# Patient Record
Sex: MALE | Race: WHITE | NOT HISPANIC OR LATINO
[De-identification: names, ages, dates, MRNs, and addresses within clinical notes are randomized per-mention and may not be internally consistent; named-entity substitution may affect disease eponyms.]

---

## 2017-08-14 ENCOUNTER — TRANSCRIPTION ENCOUNTER (OUTPATIENT)
Age: 67
End: 2017-08-14

## 2018-07-23 ENCOUNTER — TRANSCRIPTION ENCOUNTER (OUTPATIENT)
Age: 68
End: 2018-07-23

## 2018-08-28 ENCOUNTER — OUTPATIENT (OUTPATIENT)
Dept: OUTPATIENT SERVICES | Facility: HOSPITAL | Age: 68
LOS: 1 days | End: 2018-08-28

## 2020-02-20 ENCOUNTER — TRANSCRIPTION ENCOUNTER (OUTPATIENT)
Age: 70
End: 2020-02-20

## 2020-03-23 ENCOUNTER — TRANSCRIPTION ENCOUNTER (OUTPATIENT)
Age: 70
End: 2020-03-23

## 2020-03-24 ENCOUNTER — OUTPATIENT (OUTPATIENT)
Dept: OUTPATIENT SERVICES | Facility: HOSPITAL | Age: 70
LOS: 1 days | End: 2020-03-24

## 2020-12-02 ENCOUNTER — OUTPATIENT (OUTPATIENT)
Dept: OUTPATIENT SERVICES | Facility: HOSPITAL | Age: 70
LOS: 1 days | End: 2020-12-02

## 2021-04-28 ENCOUNTER — APPOINTMENT (OUTPATIENT)
Dept: MRI IMAGING | Facility: CLINIC | Age: 71
End: 2021-04-28

## 2021-05-04 ENCOUNTER — OUTPATIENT (OUTPATIENT)
Dept: OUTPATIENT SERVICES | Facility: HOSPITAL | Age: 71
LOS: 1 days | End: 2021-05-04

## 2021-05-16 ENCOUNTER — TRANSCRIPTION ENCOUNTER (OUTPATIENT)
Age: 71
End: 2021-05-16

## 2021-06-14 ENCOUNTER — APPOINTMENT (OUTPATIENT)
Dept: DISASTER EMERGENCY | Facility: CLINIC | Age: 71
End: 2021-06-14

## 2021-06-14 DIAGNOSIS — Z01.818 ENCOUNTER FOR OTHER PREPROCEDURAL EXAMINATION: ICD-10-CM

## 2021-06-15 LAB — SARS-COV-2 N GENE NPH QL NAA+PROBE: NOT DETECTED

## 2021-06-17 ENCOUNTER — OUTPATIENT (OUTPATIENT)
Dept: OUTPATIENT SERVICES | Facility: HOSPITAL | Age: 71
LOS: 1 days | End: 2021-06-17

## 2021-06-28 ENCOUNTER — APPOINTMENT (OUTPATIENT)
Dept: NEUROSURGERY | Facility: CLINIC | Age: 71
End: 2021-06-28
Payer: MEDICARE

## 2021-06-28 VITALS — HEIGHT: 74 IN | WEIGHT: 200 LBS | BODY MASS INDEX: 25.67 KG/M2

## 2021-06-28 PROCEDURE — 99204 OFFICE O/P NEW MOD 45 MIN: CPT

## 2021-06-28 NOTE — PLAN
[FreeTextEntry1] : In summary this 62-year-old male has had 10 years of residual left leg pain and left leg weakness since surgery in 2010 with Dr. Leal presents for neurosurgical consultation.  At this point I have reviewed his symptoms and his MRIs and there is no neurosurgical intervention that we may offer him at this time.  I recommend continued pain management to include nonnarcotic options.  Patient understands and agrees.  I have answered all the patient's questions to his satisfaction

## 2021-06-28 NOTE — HISTORY OF PRESENT ILLNESS
[FreeTextEntry1] : Low back and left leg pain [de-identified] : This is a 70-year-old male has a history of a L4-L5 decompression fusion with Dr. Leal approximately 10 years ago he states after the surgery he has had residual left leg weakness as well as low back and left leg pain.  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  He is trying to avoid narcotic pain medication now and is determining whether there is any surgical options available to help with his pain.  He reports pain in his low back left buttock and radiates that is to his left left thigh and leg.  He reports numbness tingling in the same area reports weakness in the same area denies any bladder bowel dysfunction.  He states that none of his symptoms have changed over the past many years.  In the past he is undergone physical therapy medication treatment and pain management which has included epidural steroid injections.  Has not seen his pain management doctor in quite some time

## 2021-06-28 NOTE — RESULTS/DATA
[FreeTextEntry1] : MRI of the lumbar spine which shows a previous L4-L5 decompression.  There are small disc bulges seen throughout the adjacent levels but no explanation regards to his symptomology no severe stenosis seen no significant nerve root encroachment seen

## 2021-06-28 NOTE — REASON FOR VISIT
[New Patient Visit] : a new patient visit [FreeTextEntry1] : lOWER BACK PAIN- PREVIOUS SURGERY- IMAGING DONE AT [facility].

## 2021-07-09 ENCOUNTER — APPOINTMENT (OUTPATIENT)
Dept: NEUROSURGERY | Facility: CLINIC | Age: 71
End: 2021-07-09
Payer: MEDICARE

## 2021-07-09 DIAGNOSIS — M96.1 POSTLAMINECTOMY SYNDROME, NOT ELSEWHERE CLASSIFIED: ICD-10-CM

## 2021-07-09 PROCEDURE — 99441: CPT

## 2021-07-09 NOTE — REASON FOR VISIT
[Follow-Up: _____] : a [unfilled] follow-up visit [Home] : at home, [unfilled] , at the time of the visit. [Medical Office: ([facility])___] : at the medical office located in  [FreeTextEntry1] : MRI Review- [facility]

## 2021-07-09 NOTE — HISTORY OF PRESENT ILLNESS
[FreeTextEntry1] : I spoke to Wang on the telephone today to summarize some of the findings of his MRI study done at [facility].  He had a L4-S1 interbody fusion with pedicle screws in the distant past by Dr. Leal.  Imaging looks quite good.  The decompression in the region of the surgery appears completely satisfactory.  He has a robust fusion of the spine.  The interbody spaces are in position.  The pedicle screws appear to be in good position.  There is minimal to no adjacent segment degeneration.  This point I explained all of this to him in detail and is really no role for surgical intervention.  I recommended a pain management evaluation and other nonsurgical methods to treat his back problem.  He seems satisfied with this conversation.  He is discharged from my point of view.

## 2021-10-21 ENCOUNTER — TRANSCRIPTION ENCOUNTER (OUTPATIENT)
Age: 71
End: 2021-10-21

## 2022-01-05 ENCOUNTER — TRANSCRIPTION ENCOUNTER (OUTPATIENT)
Age: 72
End: 2022-01-05

## 2022-06-27 ENCOUNTER — APPOINTMENT (OUTPATIENT)
Dept: CARDIOLOGY | Facility: CLINIC | Age: 72
End: 2022-06-27
Payer: MEDICARE

## 2022-06-27 ENCOUNTER — NON-APPOINTMENT (OUTPATIENT)
Age: 72
End: 2022-06-27

## 2022-06-27 VITALS — DIASTOLIC BLOOD PRESSURE: 76 MMHG | SYSTOLIC BLOOD PRESSURE: 142 MMHG

## 2022-06-27 VITALS
DIASTOLIC BLOOD PRESSURE: 80 MMHG | RESPIRATION RATE: 16 BRPM | HEIGHT: 74 IN | OXYGEN SATURATION: 95 % | BODY MASS INDEX: 26.82 KG/M2 | HEART RATE: 89 BPM | TEMPERATURE: 97.8 F | WEIGHT: 209 LBS | SYSTOLIC BLOOD PRESSURE: 140 MMHG

## 2022-06-27 DIAGNOSIS — E78.2 MIXED HYPERLIPIDEMIA: ICD-10-CM

## 2022-06-27 DIAGNOSIS — F41.0 PANIC DISORDER [EPISODIC PAROXYSMAL ANXIETY]: ICD-10-CM

## 2022-06-27 DIAGNOSIS — Z86.19 PERSONAL HISTORY OF OTHER INFECTIOUS AND PARASITIC DISEASES: ICD-10-CM

## 2022-06-27 DIAGNOSIS — I45.10 UNSPECIFIED RIGHT BUNDLE-BRANCH BLOCK: ICD-10-CM

## 2022-06-27 DIAGNOSIS — I10 ESSENTIAL (PRIMARY) HYPERTENSION: ICD-10-CM

## 2022-06-27 DIAGNOSIS — Z78.9 OTHER SPECIFIED HEALTH STATUS: ICD-10-CM

## 2022-06-27 DIAGNOSIS — R01.1 CARDIAC MURMUR, UNSPECIFIED: ICD-10-CM

## 2022-06-27 DIAGNOSIS — E78.1 PURE HYPERGLYCERIDEMIA: ICD-10-CM

## 2022-06-27 DIAGNOSIS — F12.90 CANNABIS USE, UNSPECIFIED, UNCOMPLICATED: ICD-10-CM

## 2022-06-27 DIAGNOSIS — Z00.00 ENCOUNTER FOR GENERAL ADULT MEDICAL EXAMINATION W/OUT ABNORMAL FINDINGS: ICD-10-CM

## 2022-06-27 PROCEDURE — 93000 ELECTROCARDIOGRAM COMPLETE: CPT

## 2022-06-27 PROCEDURE — 99204 OFFICE O/P NEW MOD 45 MIN: CPT

## 2022-06-27 RX ORDER — DOXYCYCLINE HYCLATE 100 MG/1
100 TABLET ORAL
Qty: 42 | Refills: 0 | Status: DISCONTINUED | COMMUNITY
Start: 2022-04-26 | End: 2022-06-27

## 2022-06-27 RX ORDER — FLUTICASONE PROPIONATE 50 UG/1
50 SPRAY, METERED NASAL
Qty: 16 | Refills: 0 | Status: DISCONTINUED | COMMUNITY
Start: 2021-06-25 | End: 2022-06-27

## 2022-06-27 RX ORDER — TOBRAMYCIN AND DEXAMETHASONE 3; 1 MG/ML; MG/ML
0.3-0.1 SUSPENSION/ DROPS OPHTHALMIC
Qty: 2 | Refills: 0 | Status: DISCONTINUED | COMMUNITY
Start: 2021-05-16 | End: 2022-06-27

## 2022-06-27 RX ORDER — METHOCARBAMOL 750 MG/1
750 TABLET, FILM COATED ORAL
Qty: 30 | Refills: 0 | Status: DISCONTINUED | COMMUNITY
Start: 2022-04-26 | End: 2022-06-27

## 2022-06-27 RX ORDER — AMLODIPINE BESYLATE 5 MG/1
5 TABLET ORAL
Qty: 90 | Refills: 3 | Status: ACTIVE | COMMUNITY
Start: 2022-04-26 | End: 1900-01-01

## 2022-06-27 RX ORDER — MECLIZINE HYDROCHLORIDE 25 MG/1
25 TABLET ORAL
Qty: 90 | Refills: 3 | Status: ACTIVE | COMMUNITY
Start: 2021-06-25

## 2022-06-27 RX ORDER — ATORVASTATIN CALCIUM 20 MG/1
20 TABLET, FILM COATED ORAL DAILY
Qty: 90 | Refills: 3 | Status: ACTIVE | COMMUNITY
Start: 1900-01-01 | End: 1900-01-01

## 2022-06-27 NOTE — DISCUSSION/SUMMARY
[FreeTextEntry1] : 71-year-old gentleman with above medical history active medical problems as noted below\par 1.  Systolic murmur.  Essential hypertension.  Recommended echocardiogram.\par 2.  Abnormal EKG.  Right bundle branch block.  No chest pain.  Follow-up on echocardiogram.  If there is wall motion abnormality and reduction in LV systolic function consider ischemic evaluation.\par 3.  Essential hypertension.  Non-smoker.  No significant history of CHF.  No significant CKD.  Recommended low-salt diet.  Pain management.  Continue with amlodipine.\par Hypertension: Reviewed pathophysiology of hypertension.  Effect of hypertension and involvement of and organs were reviewed at length.  Relationship with lifestyle modifications in the form of low salt diet, decreasing weight, regular exercise program, low level of coronary intake and caffeine intake, and continued regular medications.  Goals of the hypertension have been reviewed.  Recommended to contact us for persistent higher than goal blood pressure.\par 4.  Mixed dyslipidemia.  Significant elevation of triglycerides low HDL.  Discussed relationship with atherosclerotic vascular disease.  Recommended to consider restarting atorvastatin 20 mg.  Risk benefits alternatives side effects reviewed.  He will have repeat labs done in about 2 months as long as no significant side effects.  And further maximization as needed.\par \par Counseling regarding low saturated fat, salt and carbohydrate intake was reviewed. Active lifestyle and regular. Exercise along with weight management is advised.\par All the above were at length reviewed. Answered all the questions. Thank you very much for this kind referral. Please do not hesitate to give me a call for any question.\par Part of this transcription was done with voice recognition software and phonetically similar errors are common. I apologize for that. Please do not hesitate to call for any questions due to above.\par \par Sincerely,\par Vida Solomon MD,FACC,CHARISMA\par

## 2022-06-27 NOTE — PHYSICAL EXAM
[Well Developed] : well developed [Well Nourished] : well nourished [No Acute Distress] : no acute distress [Normal Venous Pressure] : normal venous pressure [No Carotid Bruit] : no carotid bruit [Normal S1, S2] : normal S1, S2 [No Rub] : no rub [No Gallop] : no gallop [Clear Lung Fields] : clear lung fields [Good Air Entry] : good air entry [No Respiratory Distress] : no respiratory distress  [Soft] : abdomen soft [No Edema] : no edema [No Cyanosis] : no cyanosis [No Clubbing] : no clubbing [No Varicosities] : no varicosities [Moves all extremities] : moves all extremities [Normal Speech] : normal speech [Alert and Oriented] : alert and oriented [de-identified] : Systolic ejection murmur 2/6 at the base [de-identified] : Arthritic gait

## 2022-06-27 NOTE — REASON FOR VISIT
[Symptom and Test Evaluation] : symptom and test evaluation [Hyperlipidemia] : hyperlipidemia [Hypertension] : hypertension [FreeTextEntry3] : Dr. Ya [FreeTextEntry1] : 71-year-old white gentleman is referred to me for consultation in presence of\par Abnormal EKG\par Essential hypertension he was started on amlodipine which he ran out a week ago and has not refilled\par Mixed dyslipidemia with significantly elevated triglycerides.  He was on atorvastatin many years ago which she has stopped.\par \par He has significant chronic back pain and chronic Lyme with significant intermittent disability.\par He is a musician.\par He denies any significant chest pain.  But his activity level is limited.\par He has no significant PND orthopnea or pedal edema\par He has no significant palpitation dizziness near syncopal syncopal event though he has had intermittent cognitive dysfunction he attributes to history of Lyme disease.\par He has no claudication pain.

## 2022-06-27 NOTE — ASSESSMENT
[FreeTextEntry1] : Reviewed on June 27, 2022\par EKG shows normal sinus rhythm right bundle branch block\par Labs April 27, 2022 hemoglobin A1c 5.7.  TSH 1.3.  Triglycerides 436 HDL 41 total cholesterol 267.  Trace urinary protein potassium 4.5 sodium 143 creatinine 0.86.

## 2022-09-12 ENCOUNTER — APPOINTMENT (OUTPATIENT)
Dept: CARDIOLOGY | Facility: CLINIC | Age: 72
End: 2022-09-12

## 2023-03-22 ENCOUNTER — APPOINTMENT (OUTPATIENT)
Dept: UROLOGY | Facility: CLINIC | Age: 73
End: 2023-03-22
Payer: MEDICARE

## 2023-03-22 NOTE — HISTORY OF PRESENT ILLNESS
[FreeTextEntry1] : 72 male w/ PMH HTN, HL of who presents with consultation for hematuria. \par \par Past urologic history includes:\par Smoker: No / only marijuana\par No family history of  malignancies.\par No prior history to radiation/cyclophosphamide. No history of recurrent UTI or chronic catheterization. \par Denies personal and family history of kidney stones and is not experiencing intermittent sharp flank pain radiating to groin. Denies history of blood clots.\par Last Serum Cr:\par anticoagulants / Aspirin: \par Recent Imaging:\par UCx: negative\par

## 2023-04-03 ENCOUNTER — APPOINTMENT (OUTPATIENT)
Dept: UROLOGY | Facility: CLINIC | Age: 73
End: 2023-04-03
Payer: MEDICARE

## 2023-04-03 ENCOUNTER — RESULT CHARGE (OUTPATIENT)
Age: 73
End: 2023-04-03

## 2023-04-03 ENCOUNTER — NON-APPOINTMENT (OUTPATIENT)
Age: 73
End: 2023-04-03

## 2023-04-03 VITALS
OXYGEN SATURATION: 98 % | DIASTOLIC BLOOD PRESSURE: 68 MMHG | WEIGHT: 197 LBS | BODY MASS INDEX: 25.28 KG/M2 | SYSTOLIC BLOOD PRESSURE: 121 MMHG | HEIGHT: 74 IN | HEART RATE: 72 BPM | TEMPERATURE: 96.6 F

## 2023-04-03 DIAGNOSIS — R31.21 ASYMPTOMATIC MICROSCOPIC HEMATURIA: ICD-10-CM

## 2023-04-03 LAB
BILIRUB UR QL STRIP: NEGATIVE
CLARITY UR: CLEAR
COLLECTION METHOD: NORMAL
GLUCOSE UR-MCNC: NEGATIVE
HCG UR QL: 0.2 EU/DL
HGB UR QL STRIP.AUTO: NORMAL
KETONES UR-MCNC: NEGATIVE
LEUKOCYTE ESTERASE UR QL STRIP: NEGATIVE
NITRITE UR QL STRIP: NEGATIVE
PH UR STRIP: 5.5
PROT UR STRIP-MCNC: 30
SP GR UR STRIP: 1.03

## 2023-04-03 PROCEDURE — 99204 OFFICE O/P NEW MOD 45 MIN: CPT

## 2023-04-03 RX ORDER — GABAPENTIN 600 MG/1
600 TABLET, COATED ORAL DAILY
Refills: 0 | Status: DISCONTINUED | COMMUNITY
End: 2023-04-03

## 2023-04-03 NOTE — LETTER BODY
[Dear  ___] : Dear  [unfilled], [Consult Letter:] : I had the pleasure of evaluating your patient, [unfilled]. [Please see my note below.] : Please see my note below. [Consult Closing:] : Thank you very much for allowing me to participate in the care of this patient.  If you have any questions, please do not hesitate to contact me. [Sincerely,] : Sincerely, [FreeTextEntry3] : Aminta Gabriel MD\par Urologic Oncology\par Robotic & Endoscopic urology\par [facility] [place]\par [facility]\par

## 2023-04-03 NOTE — ASSESSMENT
[FreeTextEntry1] : 72 male, 50 year marijuana smoker with microhematuria\par \par We discussed the implications of gross hematuria and the various causes, which include infection, kidney stones, bladder tumor, or kidney tumor. I explained that many times we are not able to find a specific cause of blood in the urine.\par \par The evaluation for hematuria includes urine culture, urine cytology, cystoscopy, and CT urogram.\par \par We will arrange for the patient to undergo these tests, and we will determine further follow-up and management based on the results of these diagnostic studies.\par

## 2023-04-03 NOTE — HISTORY OF PRESENT ILLNESS
[FreeTextEntry1] : 71 yo male w/ PMH of HTN, HL, murmur who presents with consultation for microhematuria. \par UA: small blood / 30 protein\par SCr=0.9 / PSA=1.18\par Past urologic history includes: neg\par Smoker: Yes marijuana since 17 years old\par family history of  malignancies: unknown as he was adopted\par \par anticoagulants / Aspirin: none\par  \par

## 2023-04-10 LAB — URINE CYTOLOGY: NORMAL

## 2023-09-20 ENCOUNTER — NON-APPOINTMENT (OUTPATIENT)
Age: 73
End: 2023-09-20

## 2024-05-09 ENCOUNTER — NON-APPOINTMENT (OUTPATIENT)
Age: 74
End: 2024-05-09

## 2024-11-21 ENCOUNTER — APPOINTMENT (OUTPATIENT)
Dept: OPHTHALMOLOGY | Facility: CLINIC | Age: 74
End: 2024-11-21
Payer: MEDICARE

## 2024-11-21 ENCOUNTER — NON-APPOINTMENT (OUTPATIENT)
Age: 74
End: 2024-11-21

## 2024-11-21 PROCEDURE — 92004 COMPRE OPH EXAM NEW PT 1/>: CPT

## 2025-07-07 ENCOUNTER — NON-APPOINTMENT (OUTPATIENT)
Age: 75
End: 2025-07-07

## 2025-07-08 ENCOUNTER — INPATIENT (INPATIENT)
Facility: HOSPITAL | Age: 75
LOS: 1 days | Discharge: HOME | DRG: 406 | End: 2025-07-10
Attending: STUDENT IN AN ORGANIZED HEALTH CARE EDUCATION/TRAINING PROGRAM | Admitting: STUDENT IN AN ORGANIZED HEALTH CARE EDUCATION/TRAINING PROGRAM
Payer: MEDICARE

## 2025-07-08 VITALS
RESPIRATION RATE: 20 BRPM | OXYGEN SATURATION: 97 % | WEIGHT: 144.84 LBS | DIASTOLIC BLOOD PRESSURE: 86 MMHG | SYSTOLIC BLOOD PRESSURE: 171 MMHG | HEART RATE: 88 BPM | TEMPERATURE: 98 F

## 2025-07-08 DIAGNOSIS — Z90.89 ACQUIRED ABSENCE OF OTHER ORGANS: Chronic | ICD-10-CM

## 2025-07-08 DIAGNOSIS — Z98.890 OTHER SPECIFIED POSTPROCEDURAL STATES: Chronic | ICD-10-CM

## 2025-07-08 DIAGNOSIS — Z98.1 ARTHRODESIS STATUS: Chronic | ICD-10-CM

## 2025-07-08 DIAGNOSIS — K86.89 OTHER SPECIFIED DISEASES OF PANCREAS: ICD-10-CM

## 2025-07-08 LAB
ALBUMIN SERPL ELPH-MCNC: 4.1 G/DL — SIGNIFICANT CHANGE UP (ref 3.3–5.2)
ALP SERPL-CCNC: 648 U/L — HIGH (ref 40–120)
ALT FLD-CCNC: 476 U/L — HIGH
ANION GAP SERPL CALC-SCNC: 15 MMOL/L — SIGNIFICANT CHANGE UP (ref 5–17)
APTT BLD: 32.7 SEC — SIGNIFICANT CHANGE UP (ref 26.1–36.8)
AST SERPL-CCNC: 259 U/L — HIGH
BASOPHILS # BLD AUTO: 0.08 K/UL — SIGNIFICANT CHANGE UP (ref 0–0.2)
BASOPHILS NFR BLD AUTO: 0.9 % — SIGNIFICANT CHANGE UP (ref 0–2)
BILIRUB SERPL-MCNC: 8.6 MG/DL — HIGH (ref 0.4–2)
BLD GP AB SCN SERPL QL: SIGNIFICANT CHANGE UP
BUN SERPL-MCNC: 14.2 MG/DL — SIGNIFICANT CHANGE UP (ref 8–20)
CALCIUM SERPL-MCNC: 10.1 MG/DL — SIGNIFICANT CHANGE UP (ref 8.4–10.5)
CHLORIDE SERPL-SCNC: 102 MMOL/L — SIGNIFICANT CHANGE UP (ref 96–108)
CO2 SERPL-SCNC: 21 MMOL/L — LOW (ref 22–29)
CREAT SERPL-MCNC: 0.61 MG/DL — SIGNIFICANT CHANGE UP (ref 0.5–1.3)
EGFR: 102 ML/MIN/1.73M2 — SIGNIFICANT CHANGE UP
EGFR: 102 ML/MIN/1.73M2 — SIGNIFICANT CHANGE UP
EOSINOPHIL # BLD AUTO: 0.19 K/UL — SIGNIFICANT CHANGE UP (ref 0–0.5)
EOSINOPHIL NFR BLD AUTO: 2.1 % — SIGNIFICANT CHANGE UP (ref 0–6)
GLUCOSE SERPL-MCNC: 118 MG/DL — HIGH (ref 70–99)
HCT VFR BLD CALC: 43 % — SIGNIFICANT CHANGE UP (ref 39–50)
HGB BLD-MCNC: 14.2 G/DL — SIGNIFICANT CHANGE UP (ref 13–17)
IMM GRANULOCYTES # BLD AUTO: 0.06 K/UL — SIGNIFICANT CHANGE UP (ref 0–0.07)
IMM GRANULOCYTES NFR BLD AUTO: 0.7 % — SIGNIFICANT CHANGE UP (ref 0–0.9)
INR BLD: 1.15 RATIO — SIGNIFICANT CHANGE UP (ref 0.85–1.16)
LYMPHOCYTES # BLD AUTO: 1.43 K/UL — SIGNIFICANT CHANGE UP (ref 1–3.3)
LYMPHOCYTES NFR BLD AUTO: 15.7 % — SIGNIFICANT CHANGE UP (ref 13–44)
MCHC RBC-ENTMCNC: 31.4 PG — SIGNIFICANT CHANGE UP (ref 27–34)
MCHC RBC-ENTMCNC: 33 G/DL — SIGNIFICANT CHANGE UP (ref 32–36)
MCV RBC AUTO: 95.1 FL — SIGNIFICANT CHANGE UP (ref 80–100)
MONOCYTES # BLD AUTO: 0.97 K/UL — HIGH (ref 0–0.9)
MONOCYTES NFR BLD AUTO: 10.7 % — SIGNIFICANT CHANGE UP (ref 2–14)
NEUTROPHILS # BLD AUTO: 6.35 K/UL — SIGNIFICANT CHANGE UP (ref 1.8–7.4)
NEUTROPHILS NFR BLD AUTO: 69.9 % — SIGNIFICANT CHANGE UP (ref 43–77)
NRBC # BLD AUTO: 0 K/UL — SIGNIFICANT CHANGE UP (ref 0–0)
NRBC # FLD: 0 K/UL — SIGNIFICANT CHANGE UP (ref 0–0)
NRBC BLD AUTO-RTO: 0 /100 WBCS — SIGNIFICANT CHANGE UP (ref 0–0)
PLATELET # BLD AUTO: 208 K/UL — SIGNIFICANT CHANGE UP (ref 150–400)
PMV BLD: 11.5 FL — SIGNIFICANT CHANGE UP (ref 7–13)
POTASSIUM SERPL-MCNC: 3.5 MMOL/L — SIGNIFICANT CHANGE UP (ref 3.5–5.3)
POTASSIUM SERPL-SCNC: 3.5 MMOL/L — SIGNIFICANT CHANGE UP (ref 3.5–5.3)
PROT SERPL-MCNC: 7.6 G/DL — SIGNIFICANT CHANGE UP (ref 6.6–8.7)
PROTHROM AB SERPL-ACNC: 13 SEC — SIGNIFICANT CHANGE UP (ref 9.9–13.4)
RBC # BLD: 4.52 M/UL — SIGNIFICANT CHANGE UP (ref 4.2–5.8)
RBC # FLD: 13.3 % — SIGNIFICANT CHANGE UP (ref 10.3–14.5)
SODIUM SERPL-SCNC: 138 MMOL/L — SIGNIFICANT CHANGE UP (ref 135–145)
WBC # BLD: 9.08 K/UL — SIGNIFICANT CHANGE UP (ref 3.8–10.5)
WBC # FLD AUTO: 9.08 K/UL — SIGNIFICANT CHANGE UP (ref 3.8–10.5)

## 2025-07-08 PROCEDURE — 85610 PROTHROMBIN TIME: CPT

## 2025-07-08 PROCEDURE — 85730 THROMBOPLASTIN TIME PARTIAL: CPT

## 2025-07-08 PROCEDURE — 99053 MED SERV 10PM-8AM 24 HR FAC: CPT

## 2025-07-08 PROCEDURE — 99223 1ST HOSP IP/OBS HIGH 75: CPT | Mod: GC

## 2025-07-08 PROCEDURE — 86901 BLOOD TYPING SEROLOGIC RH(D): CPT

## 2025-07-08 PROCEDURE — 86850 RBC ANTIBODY SCREEN: CPT

## 2025-07-08 PROCEDURE — 99223 1ST HOSP IP/OBS HIGH 75: CPT

## 2025-07-08 PROCEDURE — 85025 COMPLETE CBC W/AUTO DIFF WBC: CPT

## 2025-07-08 PROCEDURE — 80053 COMPREHEN METABOLIC PANEL: CPT

## 2025-07-08 PROCEDURE — 99285 EMERGENCY DEPT VISIT HI MDM: CPT

## 2025-07-08 PROCEDURE — 36415 COLL VENOUS BLD VENIPUNCTURE: CPT

## 2025-07-08 PROCEDURE — 86900 BLOOD TYPING SEROLOGIC ABO: CPT

## 2025-07-08 RX ORDER — GABAPENTIN 400 MG/1
300 CAPSULE ORAL DAILY
Refills: 0 | Status: DISCONTINUED | OUTPATIENT
Start: 2025-07-08 | End: 2025-07-10

## 2025-07-08 RX ORDER — ERTAPENEM SODIUM 1 G/1
1000 INJECTION, POWDER, LYOPHILIZED, FOR SOLUTION INTRAMUSCULAR; INTRAVENOUS ONCE
Refills: 0 | Status: COMPLETED | OUTPATIENT
Start: 2025-07-09 | End: 2025-07-09

## 2025-07-08 RX ORDER — LIDOCAINE HYDROCHLORIDE 20 MG/ML
1 JELLY TOPICAL EVERY 24 HOURS
Refills: 0 | Status: DISCONTINUED | OUTPATIENT
Start: 2025-07-08 | End: 2025-07-10

## 2025-07-08 RX ORDER — ACETAMINOPHEN 500 MG/5ML
650 LIQUID (ML) ORAL EVERY 6 HOURS
Refills: 0 | Status: DISCONTINUED | OUTPATIENT
Start: 2025-07-08 | End: 2025-07-10

## 2025-07-08 RX ORDER — INDOMETHACIN 50 MG
100 CAPSULE ORAL ONCE
Refills: 0 | Status: COMPLETED | OUTPATIENT
Start: 2025-07-09 | End: 2025-07-09

## 2025-07-08 RX ORDER — B1/B2/B3/B5/B6/B12/VIT C/FOLIC 500-0.5 MG
1 TABLET ORAL
Refills: 0 | DISCHARGE

## 2025-07-08 RX ORDER — MAGNESIUM, ALUMINUM HYDROXIDE 200-200 MG
30 TABLET,CHEWABLE ORAL EVERY 4 HOURS
Refills: 0 | Status: DISCONTINUED | OUTPATIENT
Start: 2025-07-08 | End: 2025-07-10

## 2025-07-08 RX ORDER — CYANOCOBALAMIN 1000 UG/ML
1 INJECTION INTRAMUSCULAR; SUBCUTANEOUS
Refills: 0 | DISCHARGE

## 2025-07-08 RX ORDER — METHOCARBAMOL 500 MG/1
750 TABLET, FILM COATED ORAL THREE TIMES A DAY
Refills: 0 | Status: DISCONTINUED | OUTPATIENT
Start: 2025-07-08 | End: 2025-07-10

## 2025-07-08 RX ORDER — MELATONIN 5 MG
3 TABLET ORAL AT BEDTIME
Refills: 0 | Status: DISCONTINUED | OUTPATIENT
Start: 2025-07-08 | End: 2025-07-09

## 2025-07-08 RX ORDER — ONDANSETRON HCL/PF 4 MG/2 ML
4 VIAL (ML) INJECTION EVERY 8 HOURS
Refills: 0 | Status: DISCONTINUED | OUTPATIENT
Start: 2025-07-08 | End: 2025-07-10

## 2025-07-08 RX ADMIN — Medication 1000 MILLILITER(S): at 09:46

## 2025-07-08 RX ADMIN — LIDOCAINE HYDROCHLORIDE 1 PATCH: 20 JELLY TOPICAL at 20:11

## 2025-07-08 RX ADMIN — Medication 3 MILLIGRAM(S): at 21:36

## 2025-07-08 RX ADMIN — Medication 1000 MILLILITER(S): at 08:46

## 2025-07-08 RX ADMIN — Medication 5 MILLIGRAM(S): at 17:58

## 2025-07-08 RX ADMIN — GABAPENTIN 300 MILLIGRAM(S): 400 CAPSULE ORAL at 21:35

## 2025-07-08 RX ADMIN — Medication 650 MILLIGRAM(S): at 18:06

## 2025-07-08 NOTE — CONSULT NOTE ADULT - SUBJECTIVE AND OBJECTIVE BOX
HPI Objective Statement: Wang Brown (Zeek) is a 74 year old male with a PmHx of HLD, hypertriglyceridemia, HTN (states it is "White Coat HTN", not on any BP meds), vitamin B12 deficiency and chronic back pain (s/p Lumbar fusion) presented to ED after [place] [facility] [place] finding of pancreatic head mass. Patient reported  juandice, dark color urine, and "tan color stool" x 1 week, went to urgent care and was referred to ED after found to have elevated liver chemistry. At [facility], lab reports were significant for  Bilirubin Total/Direct elevated 7.0/5.2 elevated Alk Phos 610, elevated AST (291) and elevated ALT (551).  CT Abdomen and Pelvis notable for a 4.5 cm mass in the Pancreas head. Patient reported epigastric abdominal pressure, poor appetite, early satiety and epigastric pain x past 1 week. Denies nausea, vomiting, fever, chills, change in bowel habits. Denies use of alcohol. Endorses occasional use of Marijuana. Unable to obtain family history as he was adopted.    This morning,  T bili  8.6, AST  259,  ALT  476, AlkPhos  648.     PAST MEDICAL & SURGICAL HISTORY:      REVIEW OF SYSTEMS:   General: Negative  HEENT: Negative  CV: Negative  Respiratory: Negative  GI: See HPI  : Negative  MSK: Negative  Hematologic: Negative  Skin: Negative    MEDICATIONS:   MEDICATIONS  (STANDING):    MEDICATIONS  (PRN):        ALLERGIES:   Allergies    No Known Allergies    Intolerances        Substance Use:   (  ) never used  (  )   Tobacco Usage:  (   ) never smoked   (   ) former smoker   (   ) current smoker  (     ) pack year  (        ) last cigarette date  Alcohol Usage: Denies       VITAL SIGNS:   Vital Signs Last 24 Hrs  T(C): 36.7 (08 Jul 2025 06:38), Max: 36.7 (08 Jul 2025 06:38)  T(F): 98 (08 Jul 2025 06:38), Max: 98 (08 Jul 2025 06:38)  HR: 88 (08 Jul 2025 06:38) (88 - 88)  BP: 171/86 (08 Jul 2025 06:38) (171/86 - 171/86)  BP(mean): --  RR: 20 (08 Jul 2025 06:38) (20 - 20)  SpO2: 97% (08 Jul 2025 06:38) (97% - 97%)    Parameters below as of 08 Jul 2025 06:38  Patient On (Oxygen Delivery Method): room air      PHYSICAL EXAM:   GENERAL:  No acute distress  HEENT:  sclera icteric  CHEST:  No increased effort  HEART:  Regular rate  ABDOMEN:  Soft, non tender, on-distended, + bowel sounds, no rebound or guarding  EXTREMITIES: No edema  SKIN:  + jaundice  NEURO:  Alert, oriented  Psych: Calm, cooperative      LABS:                        14.2   9.08  )-----------( 208      ( 08 Jul 2025 08:46 )             43.0     Hemoglobin: 14.2 g/dL (07-08-25 @ 08:46)    07-08    138  |  102  |  14.2  ----------------------------<  118[H]  3.5   |  21.0[L]  |  0.61    Ca    10.1      08 Jul 2025 08:46    TPro  7.6  /  Alb  4.1  /  TBili  8.6[H]  /  DBili  x   /  AST  259[H]  /  ALT  476[H]  /  AlkPhos  648[H]  07-08    LIVER FUNCTIONS - ( 08 Jul 2025 08:46 )  Alb: 4.1 g/dL / Pro: 7.6 g/dL / ALK PHOS: 648 U/L / ALT: 476 U/L / AST: 259 U/L / GGT: x             PT/INR - ( 08 Jul 2025 08:46 )   PT: 13.0 sec;   INR: 1.15 ratio         PTT - ( 08 Jul 2025 08:46 )  PTT:32.7 sec        ACC: [id number]     EXAM:  CT ABDOMEN AND PELVIS IC   ORDERED BY: JORDY ULRICH    PROCEDURE DATE:  07/07/2025      INTERPRETATION:  CLINICAL INFORMATION: jaundice EPC    COMPARISON: None.    CONTRAST/COMPLICATIONS:  IV Contrast: Omnipaque 350  90 cc administered   10 cc discarded  Oral Contrast: NONE.    PROCEDURE:  CT of the Abdomen and Pelvis was performed.  Sagittal and coronal reformats were performed.    FINDINGS:    LOWER CHEST: Within normal limits.    LIVER: Within normal limits.  BILE DUCTS: There is mild biliary dilatation.  GALLBLADDER: Within normal limits.  SPLEEN: Within normal limits.  PANCREAS: There is a 4.5 cm mass in the pancreatic head. There is interface with the SMV by less than 180 degrees with mild narrowing. The mass contacts the anterior abdominal aorta.  ADRENALS: Within normal limits.  KIDNEYS/URETERS: Subcentimeter lesions too small to characterize.    BLADDER: Within normal limits.  REPRODUCTIVE ORGANS: The prostate is enlarged.    BOWEL: No bowel obstruction. The appendix is normal.  PERITONEUM/RETROPERITONEUM: Within normal limits.  VESSELS:  See above.  LYMPH NODES: Within normal limits.  ABDOMINAL WALL: Within normal limits.  BONES: Lumbosacral fusion and posterior laminectomy changes.    IMPRESSION: A 4.5 cm pancreatic head mass with mild biliary dilatation.      --- End of Report ---      WALDEMAR TINAJERO MD; Attending Radiologist  This document has been electronically signed. Jul 7 2025  4:31PM

## 2025-07-08 NOTE — CONSULT NOTE ADULT - NS ATTEND AMEND GEN_ALL_CORE FT
Mr. Roman is a 74 year old gentleman who was transferred from [facility] by Dr. Brar for obstructive, painless jaundice, imaging and updated labs interpreted, CT with evidence of large (4.5 cm) mass in pancreatic head. Bilirubin markedly elevated and liver chemistries. CEA / CA 19-9 pending. Will plan for interval endoscopic ultrasound with fine needle biopsy and likely ERCP with biliary decompression / stent placement. Discussed plan with patient and covering provider. Tentatively planning for tomorrow, empirically keep NPO at midnight. continue supportive care. We will continue to follow along with you.

## 2025-07-08 NOTE — CONSULT NOTE ADULT - ASSESSMENT
74 with obstructive jaundice, 4.5 pancreatic mass    -Appreciate GI input, agree with plan for EUS/ +/- ERCP tomorrow   -Will follow along   -Trend CBC, CMP, coag  -Protonix 40 mg as GI mucosal protection  -Avoid NSAID  -CEA, CA 19-9  -diet as tolerating  -Pleas NPO after midnight for procedure  -Avoid chemical DVT prophylaxis in AM for procedure   
 a 74 year old male with a PmHx of HLD, hypertriglyceridemia, HTN, vitamin B12 deficiency and chronic back pain (s/p Lumbar fusion) presented to ED after transferred from [facility] [place] radiology finding of pancreatic head mass.    Pancreatic head mass:   Obstructive jaundice:  CT abd/ pelv (07/07/25) showed a 4.5 cm mass in the pancreatic head. There is interface with the SMV by less than 180 degrees with mild narrowing. The mass contacts the anterior abdominal aorta. Mild biliary dilation. Initial lab showed elevated liver Bilirubin Total/Direct elevated 7.0/5.2 elevated Alk Phos 610, elevated AST (291) and elevated ALT (551)  T bili  8.6, AST  259,  ALT  476, AlkPhos  648    -Trend CBC, CMP, coag  -will plan for EUS/ +/- ERCP tomorrow   -Protonix 40 mg as GI mucosal protection  -Avoid NSAID  -CEA, CA 19-9  -diet as tolerating  -Pleas NPO after midnight for procedure  -Avoid chemical DVT prophylaxis in AM for procedure

## 2025-07-08 NOTE — CONSULT NOTE ADULT - SUBJECTIVE AND OBJECTIVE BOX
HPI Objective Statement: Wang Brown (Zeek) is a 74 year old male with a PmHx of HLD, hypertriglyceridemia, HTN, vitamin B12 deficiency and chronic back pain (s/p Lumbar fusion). Patient presented to [facility] for dark urine and abdominal fullness, which he thought was hematuria, however his workup revealed finding of pancreatic head mass. He is juandiced, still has dark color urine, and his stools have been lighter in color over the last week. At [facility], lab were significant for Bilirubin Total/Direct elevated 7.0/5.2 elevated Alk Phos 610, elevated AST (291) and elevated ALT (551).      ROS: Patient reported epigastric abdominal pressure, poor appetite, early satiety and epigastric pain x past 1 week. Denies nausea, vomiting, fever, chills, change in bowel habits. Denies use of alcohol. Endorses occasional use of Marijuana. states no chest pain, no shortness of breath.     Unable to obtain family history as he was adopted.      ICU Vital Signs Last 24 Hrs  T(C): 36.7 (08 Jul 2025 06:38), Max: 36.7 (08 Jul 2025 06:38)  T(F): 98 (08 Jul 2025 06:38), Max: 98 (08 Jul 2025 06:38)  HR: 88 (08 Jul 2025 06:38) (88 - 88)  BP: 171/86 (08 Jul 2025 06:38) (171/86 - 171/86)  BP(mean): --  ABP: --  ABP(mean): --  RR: 20 (08 Jul 2025 06:38) (20 - 20)  SpO2: 97% (08 Jul 2025 06:38) (97% - 97%)    O2 Parameters below as of 08 Jul 2025 06:38  Patient On (Oxygen Delivery Method): room air    PHYSICAL EXAM:      Constitutional: Alert, NAD    Eyes: EOMI, + scleral icterus    Respiratory: normal respiratory effort    Gastrointestinal: soft nt nd,    Extremities: No edema    Neurological: Alert and oriented x3, moves all extremities    Skin: Warm, dry, + jaundice    Psychiatric: Normal affect     CBC Full  -  ( 08 Jul 2025 08:46 )  WBC Count : 9.08 K/uL  RBC Count : 4.52 M/uL  Hemoglobin : 14.2 g/dL  Hematocrit : 43.0 %  Platelet Count - Automated : 208 K/uL  Mean Cell Volume : 95.1 fl  Mean Cell Hemoglobin : 31.4 pg  Mean Cell Hemoglobin Concentration : 33.0 g/dL  Auto Neutrophil # : 6.35 K/uL  Auto Lymphocyte # : 1.43 K/uL  Auto Monocyte # : 0.97 K/uL  Auto Eosinophil # : 0.19 K/uL  Auto Basophil # : 0.08 K/uL  Auto Neutrophil % : 69.9 %  Auto Lymphocyte % : 15.7 %  Auto Monocyte % : 10.7 %  Auto Eosinophil % : 2.1 %  Auto Basophil % : 0.9 %    07-08    138  |  102  |  14.2  ----------------------------<  118[H]  3.5   |  21.0[L]  |  0.61    Ca    10.1      08 Jul 2025 08:46    TPro  7.6  /  Alb  4.1  /  TBili  8.6[H]  /  DBili  x   /  AST  259[H]  /  ALT  476[H]  /  AlkPhos  648[H]  07-08

## 2025-07-08 NOTE — H&P ADULT - HISTORY OF PRESENT ILLNESS
75 y/o M w/ PMH of HLD, HTN (per pt and fiance is white coat hypertension), B12 deficiency, chronic back pain s/p lumbar fusion, previous opioid/cocaine abuse, ?Hx hep B originally presented to  for dark urine for 1 week, there he was told he appeared jaundice and advised to go to [facility] [place]. Denies N/V, unintentional weight loss, abdominal pain. At [facility] [place] (7/7) CT A/P showed which showed a 4.5 cm mass in the pancreatic head with biliary dilation. He was recommended for transfer to [facility] but pt wanted to go home for the night to make arrangements at home and get personal items. Now pt presented to [facility] for further evaluation. In ED vitals unremarkable Tbili 8.6, ,  , .  All other labs WNL. GI and surgery consulted, planning for ERCP.

## 2025-07-08 NOTE — ED ADULT NURSE NOTE - CHIEF COMPLAINT QUOTE
Was at [facility] last night Dx with Pancreatic mass, planned for transfer here for GI Surgery, but went AMA because he does not want to wait for 3 Hours for ambulance transfer, He was referred for service under Dr. Brar (GI)

## 2025-07-08 NOTE — H&P ADULT - NSICDXPASTSURGICALHX_GEN_ALL_CORE_FT
PAST SURGICAL HISTORY:  S/P lumbar spinal fusion     S/P tonsillectomy     Status post medial meniscus repair

## 2025-07-08 NOTE — ED ADULT NURSE REASSESSMENT NOTE - NS ED NURSE REASSESS COMMENT FT1
Pt aao3, room air, vs as charted, handoff care given to BRIDGER Gandara. Pt understands plan of care, denies pain or discomfort at this time. Educated on plan of care, safety maintained. Pt aao3, room air, vs as charted, handoff care given to BRIDGER Miller. Pt understands plan of care, denies pain or discomfort at this time. Educated on plan of care, safety maintained.

## 2025-07-08 NOTE — H&P ADULT - ATTENDING COMMENTS
I attest that I have completed more than 50% of the documentation, physical exam and orders    75 y/o M w/ PMH of HLD, HTN, B12 deficiency, chronic back pain s/p lumbar fusion originally presented to  after he noticed he was jaundiced and was sent to [facility].  Pt states he initially noticed that his urine was rust colored for the past few weeks and then noticed he was jaundiced the past few days.  pt denies unintentional weigh loss and appetite remains unchanged.  He is adopted so is unsure of his family hx.  He drinks etoh very rarely, denies smoking cigarettes but occasional smokes marijuana. At [facility] [place] had CT a/p 7/7 which showed a 4.5 cm mass in the pancreatic head. There is interface with the SMV by less than 180 degrees w/ mild narrowing. The mass contacts the anterior abdominal aorta. Mild biliary dilation.  Labs at [facility] showed transaminitis and hyperbilirubinemia primarily conjugated (Tbili 7.0/ Dbili 5.2, ,  and ).  Recommendations were to transfer to [facility] and subsequently pt presents today.  Pt denies abd pain, N/V, diarrhea, fevers, chills, sob, cp, palpitations.    In ED VS WNL.  Clinically pt has jaundiced skin and icteric sclera but otherwise benign exam.  w/u today shows Tbili 8.6, ,  , .  GI and surgery consulted.  Pt will be admitted for obstructive jaundice.        Obstructive jaundice 2/2 pancreatic head mass   - 7/7 Labs at [facility]: Tbili 7.0/ Dbili 5.2, ,  and   - Labs today  Tbili 8.6, ,  ,   - CT a/p 7/7 which showed a 4.5 cm mass in the pancreatic head, interface w/ the SMV by less than 180 degrees w/ mild narrowing and mass contacts the anterior abdominal aorta and noted to have Mild biliary dilation    - Will check CEA and    - Start on protonix for GI cytoprotection   - Trend CBC and LFTs   - NPO after midnight for EUS/ +/- ERCP tomorrow 7/9  - Avoid chemical VTE ppx for now in anticipation of planned procedure tomorrow and start once cleared by GI to do so  - Surgery consulted and will follow   - GI consulted and recs noted      VTE ppx: Avoid chemical VTE ppx for now in anticipation of planned procedure tomorrow and start once cleared by GI to do so

## 2025-07-08 NOTE — ED PROVIDER NOTE - NS ED ROS FT
Endorses hematuria, abdominal pressure, pale stools   Denies N/V, abdominal pain, chest pain, palpitations, diarrhea, constipation, fever, chills

## 2025-07-08 NOTE — ED PROVIDER NOTE - CLINICAL SUMMARY MEDICAL DECISION MAKING FREE TEXT BOX
Wang Brown (Zeek) is a 74 year old male with a PmHx of HLD, hypertriglyceridemia, HTN (states it is "White Coat HTN", not on any BP meds), vitamin B12 deficiency and chronic back pain (s/p Lumbar fusion), who presents to the ED after leaving AMA; denying transfer from [facility] to [facility] (7/7) after CT abdomen and Pelvis showed a 4.5 cm mass in the Pancreatic Head in need of surgical intervention. Patient decided to leave AMA in order to gather his belongings and arrive here sooner than an ambulance transfer could. Patient receiving 1L NS, repeat CBC, CMP, PT/PTT/INR and Type and Screen. GI and surgery to be consulted. Wang Brown (Zeek) is a 74 year old male with a PmHx of HLD, hypertriglyceridemia, HTN (states it is "White Coat HTN", not on any BP meds), vitamin B12 deficiency and chronic back pain (s/p Lumbar fusion), who presents to the ED after leaving AMA; denying transfer from [facility] to [facility] (7/7) after CT abdomen and Pelvis showed a 4.5 cm mass in the Pancreatic Head in need of surgical intervention. Patient decided to leave AMA in order to gather his belongings and arrive here sooner than an ambulance transfer could. Patient receiving 1L NS, repeat CBC, CMP, PT/PTT/INR and Type and Screen. Labs reflective of previous labs at [facility] (Bilirubin 8.6, Alk Phos 648, , ), GI recommends EUS +/- ERCP for 7/9/2025. Surgery agrees with plan and will continue to follow along as consult for the pancreatic mass. Patient will be admitted to medicine under Dr. Jeffrey.

## 2025-07-08 NOTE — H&P ADULT - NSHPPHYSICALEXAM_GEN_ALL_CORE
PHYSICAL EXAM:  GENERAL: NAD, lying in bed comfortably, mild jaundice,  HEART: Regular rate and rhythm, no murmurs, rubs, or gallops  LUNGS: Unlabored respirations.  Clear to auscultation bilaterally, no crackles, wheezing, or rhonchi  ABDOMEN: Soft, nontender, nondistended, +BS  EXTREMITIES: 2+ peripheral pulses bilaterally. No clubbing, cyanosis, or edema  NERVOUS SYSTEM:  A&Ox3, no focal deficits   SKIN: No rashes or lesions

## 2025-07-08 NOTE — ED PROVIDER NOTE - ATTENDING CONTRIBUTION TO CARE
I performed a history and physical exam of the patient and discussed their management with the resident. I reviewed the resident's note and agree with the documented findings and plan of care, except as noted.. My medical decision making and observations are found above.    Plan for rpt labs, pt otherwise has no acute symptoms at the moment, plan for gi consult, and dispo pending Gi Recs

## 2025-07-08 NOTE — ED ADULT NURSE NOTE - OBJECTIVE STATEMENT
Pt presents aao3, room air, vs as charted. C/o CT findings from ER yesterday. Pt endorses food exacerbates pain, has been tolerating po and iv but cannot eat full meals, NPO since yesterday, currently denies pain. PIV placed, labs drawn and sent, awaiting further recommendations. Pt oriented to unit and staff, safety maintained.

## 2025-07-08 NOTE — CONSULT NOTE ADULT - TIME BILLING
I spent 80 minutes reviewing the patient's chart, labs, imaging, interviewing and examining patient, and discussing plan of care with the patient, resident/PA team, and other providers, excluding separately billable procedures and teaching time.

## 2025-07-08 NOTE — H&P ADULT - ASSESSMENT
73 y/o M w/ PMH of HLD, HTN (per pt and fiance is white coat hypertension), B12 deficiency, chronic back pain s/p lumbar fusion, previous opioid/cocaine abuse, ?Hx hep B 73 y/o M w/ PMH of HLD, HTN (per pt and fiance is white coat hypertension), B12 deficiency, chronic back pain s/p lumbar fusion, previous opioid/cocaine abuse, ?Hx hep B admitted for pancreatic mass    #4.5 cm pancreatic mass  -unknown family hx  -transaminitis noted   -surgery and GI following  -f/u CEA and C19-9  -continue protonix 40mg daily  -EUS/ +/- ERCP tomorrow   -NPO at midnight  -no DVT PPX in preparation for procedure tomorrow    #?Hx Hep B  -per pt was told years ago "one of his blood results came up positive for hep B something"  -ordered hepatitis panel for AM    #chronic back pain  #Hx opioid abuse  -continue home robaxin 750mg TID PRN  -continue home gabapentin 300mg daily  -lidocaine patch daily  -avoid opioids    #HTN  #HLD  -not on any home meds  -1 episode of HTN urgency 208/117. Given 5mg IV hydralazine and acetaminophen as pt was in back pain at that time- resolved  -continue to monitor    DVT PPX: ambulation. Pharmacologic ppx held for procedure

## 2025-07-08 NOTE — ED ADULT NURSE REASSESSMENT NOTE - NS ED NURSE REASSESS COMMENT FT1
Pt aao3, room air, vs as charted. pt denies pain at this time, all questions answered, pt awaiting GI surgery recommendations. safety maintained.

## 2025-07-08 NOTE — CONSULT NOTE ADULT - ATTENDING COMMENTS
74 with obstructive jaundice, found to have a 4.5cm pancreatic mass    Imaging and labs reviewed  Resectable/borderline resectable pancreatic head mass with abutment of SMV and aorta    PLAN  -Appreciate GI input, agree with plan for EUS/ +/- ERCP, and biopsy  -Staging Chest CT  -CEA, CA 19-9  -Will follow and coordinate outpatient follow up with [facility] resources upon disharge

## 2025-07-08 NOTE — ED PROVIDER NOTE - OBJECTIVE STATEMENT
Wang Brown (Zeek) is a 74 year old male with a PmHx of HLD, hypertriglyceridemia, HTN (states it is "White Coat HTN", not on any BP meds), vitamin B12 deficiency and chronic back pain (s/p Lumbar fusion), who presents to the ED after leaving AMA; denying transfer from [facility] to [facility] (7/7) after CT abdomen and Pelvis showed a 4.5 cm mass in the Pancreatic Head in need of surgical intervention. Patient decided to leave AMA in order to gather his belongings and arrive here sooner than an ambulance transfer could. He states that 1 week ago on Tuesday (7/1), he began to noticed his urine turn "coffee" colored as the day progressed. He also began to experience increasing abdominal pressure in the suprapubic area and pale stools. He denied any nausea, vomiting or abdominal pain. He attempted to schedule an appointment with the PCP (Dr. Ya), but was unable to because of the holiday. He managed his symptoms with Pepto at home.    He states on Saturday, he began to noticed his skin and eyes turning yellow. Patient went to Urgent Care on 7/1 for continued hematuria and jaundice. He was then transferred to [facility] where he received a full workup. UA was negative for UTI. CBC, Coags (PT elevated 13.8, INR elevated 1.18), CMP (Elevated Calcium 10.6, Bilirubin Total/Direct/Indirect elevated 7.0/5.2/1.8, elevated Alk Phos 610, elevated AST (291) and elevated ALT (551) and a CT Abdomen and Pelvis notable for a 4.5 cm mass in the Pancreas. Patient states once transferred to [facility] he was supposed to speak with GI (Dr. Brar) and surgery. Patient is a musician. States he had a long history of cocaine use, quit at age 52. He states he never used to drink alcohol, but started drinking socially in the past 5 months. Patient only takes gabapentin and muscle relaxer for back pain. He has not taken any of his medications today.

## 2025-07-08 NOTE — ED PROVIDER NOTE - PROGRESS NOTE DETAILS
Dennys Brar team made aware awaiting their final recs Dennys SALES GI Dr Brar team made aware awaiting their final recs    DRE Mchugh: Spoke with GI. They will decide to consult based on patient admission med v surg. General surgery called, they will consult. Patient repeat labs finalized. Dennys ATTG GI Dr Brar team made aware awaiting their final recs    DRE Mchugh: Spoke with GI. They will decide to consult based on patient admission med v surg. General surgery called, they will consult. Patient repeat labs finalized.    DRE Mchugh (1:47 pm): GI planning for EUS +/- ERCP for tomorrow 7/8. Surgery states they agree with GI and plan for EUS +/- ERCP and will continue to follow along as consult. Patient admitted to medicine under Dr. Jeffrey.

## 2025-07-08 NOTE — ED ADULT NURSE NOTE - NSFALLHARMRISKINTERV_ED_ALL_ED

## 2025-07-08 NOTE — ED PROVIDER NOTE - PHYSICAL EXAMINATION
General: Awake, alert, standing due to chronic back pain and in NAD  HEENT: Normocephalic, atraumatic. Scleral icterus (+) and present bilaterally. No conjunctival injection. EOMI. Oropharynx clear.   Neck:. Soft and supple.  Cardiac: RRR, Peripheral pulses 2+ and symmetric. No LE edema.  Resp: Lungs CTAB. No accessory muscle use  Abd: Soft, non-tender, non-distended. No guarding, rebound, or rigidity.   Skin: Jaundiced. No rashes, abrasions, or lacerations.  Neuro: AO x 4. Moves all extremities symmetrically. Motor strength and sensation grossly intact.  Psych: Appropriate mood and affect.

## 2025-07-08 NOTE — ED ADULT TRIAGE NOTE - CHIEF COMPLAINT QUOTE
Was at [facility] last night Dx with Pancreatic mass, planned for transfer here for GI Surgery, but went AMA because he does not want to wait for 3 Hours for ambulance transfer, He was referred for service under Dr. Brar (GI)

## 2025-07-09 ENCOUNTER — TRANSCRIPTION ENCOUNTER (OUTPATIENT)
Age: 75
End: 2025-07-09

## 2025-07-09 LAB
ALBUMIN SERPL ELPH-MCNC: 3.8 G/DL — SIGNIFICANT CHANGE UP (ref 3.3–5.2)
ALP SERPL-CCNC: 566 U/L — HIGH (ref 40–120)
ALT FLD-CCNC: 412 U/L — HIGH
ANION GAP SERPL CALC-SCNC: 15 MMOL/L — SIGNIFICANT CHANGE UP (ref 5–17)
AST SERPL-CCNC: 233 U/L — HIGH
BILIRUB SERPL-MCNC: 7.6 MG/DL — HIGH (ref 0.4–2)
BUN SERPL-MCNC: 12 MG/DL — SIGNIFICANT CHANGE UP (ref 8–20)
CALCIUM SERPL-MCNC: 9.4 MG/DL — SIGNIFICANT CHANGE UP (ref 8.4–10.5)
CANCER AG19-9 SERPL-ACNC: 1954 U/ML — HIGH
CEA SERPL-MCNC: 8.5 NG/ML — HIGH (ref 0–3.8)
CHLORIDE SERPL-SCNC: 104 MMOL/L — SIGNIFICANT CHANGE UP (ref 96–108)
CO2 SERPL-SCNC: 20 MMOL/L — LOW (ref 22–29)
CREAT SERPL-MCNC: 0.53 MG/DL — SIGNIFICANT CHANGE UP (ref 0.5–1.3)
EGFR: 106 ML/MIN/1.73M2 — SIGNIFICANT CHANGE UP
EGFR: 106 ML/MIN/1.73M2 — SIGNIFICANT CHANGE UP
GLUCOSE SERPL-MCNC: 114 MG/DL — HIGH (ref 70–99)
HAV IGM SER-ACNC: SIGNIFICANT CHANGE UP
HBV CORE IGM SER-ACNC: SIGNIFICANT CHANGE UP
HBV SURFACE AG SER-ACNC: SIGNIFICANT CHANGE UP
HCT VFR BLD CALC: 39.3 % — SIGNIFICANT CHANGE UP (ref 39–50)
HCV AB S/CO SERPL IA: 0.12 S/CO — SIGNIFICANT CHANGE UP (ref 0–0.79)
HCV AB SERPL-IMP: SIGNIFICANT CHANGE UP
HGB BLD-MCNC: 13.4 G/DL — SIGNIFICANT CHANGE UP (ref 13–17)
INR BLD: 1.16 RATIO — SIGNIFICANT CHANGE UP (ref 0.85–1.16)
MCHC RBC-ENTMCNC: 31.2 PG — SIGNIFICANT CHANGE UP (ref 27–34)
MCHC RBC-ENTMCNC: 34.1 G/DL — SIGNIFICANT CHANGE UP (ref 32–36)
MCV RBC AUTO: 91.6 FL — SIGNIFICANT CHANGE UP (ref 80–100)
NRBC # BLD AUTO: 0 K/UL — SIGNIFICANT CHANGE UP (ref 0–0)
NRBC # FLD: 0 K/UL — SIGNIFICANT CHANGE UP (ref 0–0)
NRBC BLD AUTO-RTO: 0 /100 WBCS — SIGNIFICANT CHANGE UP (ref 0–0)
PLATELET # BLD AUTO: 195 K/UL — SIGNIFICANT CHANGE UP (ref 150–400)
PMV BLD: 11.8 FL — SIGNIFICANT CHANGE UP (ref 7–13)
POTASSIUM SERPL-MCNC: 3.5 MMOL/L — SIGNIFICANT CHANGE UP (ref 3.5–5.3)
POTASSIUM SERPL-SCNC: 3.5 MMOL/L — SIGNIFICANT CHANGE UP (ref 3.5–5.3)
PROT SERPL-MCNC: 6.7 G/DL — SIGNIFICANT CHANGE UP (ref 6.6–8.7)
PROTHROM AB SERPL-ACNC: 13.4 SEC — SIGNIFICANT CHANGE UP (ref 9.9–13.4)
RBC # BLD: 4.29 M/UL — SIGNIFICANT CHANGE UP (ref 4.2–5.8)
RBC # FLD: 13.1 % — SIGNIFICANT CHANGE UP (ref 10.3–14.5)
SODIUM SERPL-SCNC: 139 MMOL/L — SIGNIFICANT CHANGE UP (ref 135–145)
WBC # BLD: 9.19 K/UL — SIGNIFICANT CHANGE UP (ref 3.8–10.5)
WBC # FLD AUTO: 9.19 K/UL — SIGNIFICANT CHANGE UP (ref 3.8–10.5)

## 2025-07-09 PROCEDURE — 43238 EGD US FINE NEEDLE BX/ASPIR: CPT | Mod: 59

## 2025-07-09 PROCEDURE — 80053 COMPREHEN METABOLIC PANEL: CPT

## 2025-07-09 PROCEDURE — 80074 ACUTE HEPATITIS PANEL: CPT

## 2025-07-09 PROCEDURE — 82378 CARCINOEMBRYONIC ANTIGEN: CPT

## 2025-07-09 PROCEDURE — 71260 CT THORAX DX C+: CPT | Mod: 26

## 2025-07-09 PROCEDURE — 86901 BLOOD TYPING SEROLOGIC RH(D): CPT

## 2025-07-09 PROCEDURE — 85730 THROMBOPLASTIN TIME PARTIAL: CPT

## 2025-07-09 PROCEDURE — 85610 PROTHROMBIN TIME: CPT

## 2025-07-09 PROCEDURE — 74330 X-RAY BILE/PANC ENDOSCOPY: CPT

## 2025-07-09 PROCEDURE — 86850 RBC ANTIBODY SCREEN: CPT

## 2025-07-09 PROCEDURE — 88305 TISSUE EXAM BY PATHOLOGIST: CPT | Mod: 26

## 2025-07-09 PROCEDURE — 85025 COMPLETE CBC W/AUTO DIFF WBC: CPT

## 2025-07-09 PROCEDURE — 99233 SBSQ HOSP IP/OBS HIGH 50: CPT

## 2025-07-09 PROCEDURE — 36415 COLL VENOUS BLD VENIPUNCTURE: CPT

## 2025-07-09 PROCEDURE — 71260 CT THORAX DX C+: CPT

## 2025-07-09 PROCEDURE — 86301 IMMUNOASSAY TUMOR CA 19-9: CPT

## 2025-07-09 PROCEDURE — 43264 ERCP REMOVE DUCT CALCULI: CPT | Mod: 59

## 2025-07-09 PROCEDURE — 85027 COMPLETE CBC AUTOMATED: CPT

## 2025-07-09 PROCEDURE — 86900 BLOOD TYPING SEROLOGIC ABO: CPT

## 2025-07-09 PROCEDURE — 43274 ERCP DUCT STENT PLACEMENT: CPT | Mod: 59

## 2025-07-09 PROCEDURE — 88173 CYTOPATH EVAL FNA REPORT: CPT | Mod: 26

## 2025-07-09 PROCEDURE — 43262 ENDO CHOLANGIOPANCREATOGRAPH: CPT

## 2025-07-09 PROCEDURE — 88112 CYTOPATH CELL ENHANCE TECH: CPT | Mod: 26,59

## 2025-07-09 PROCEDURE — 74328 X-RAY BILE DUCT ENDOSCOPY: CPT | Mod: 26

## 2025-07-09 DEVICE — GWIRE JAG REVOLUTION ANG 260CM: Type: IMPLANTABLE DEVICE | Status: FUNCTIONAL

## 2025-07-09 DEVICE — IMPLANTABLE DEVICE: Type: IMPLANTABLE DEVICE | Status: FUNCTIONAL

## 2025-07-09 DEVICE — CATH BLLN BIL RX 9-12CM: Type: IMPLANTABLE DEVICE | Status: FUNCTIONAL

## 2025-07-09 DEVICE — SPHINCTERTOME JAG RX 39 PRELOADED CANN: Type: IMPLANTABLE DEVICE | Status: FUNCTIONAL

## 2025-07-09 RX ORDER — MELATONIN 5 MG
5 TABLET ORAL AT BEDTIME
Refills: 0 | Status: DISCONTINUED | OUTPATIENT
Start: 2025-07-09 | End: 2025-07-10

## 2025-07-09 RX ADMIN — LIDOCAINE HYDROCHLORIDE 1 PATCH: 20 JELLY TOPICAL at 20:00

## 2025-07-09 RX ADMIN — LIDOCAINE HYDROCHLORIDE 1 PATCH: 20 JELLY TOPICAL at 07:45

## 2025-07-09 RX ADMIN — METHOCARBAMOL 750 MILLIGRAM(S): 500 TABLET, FILM COATED ORAL at 12:47

## 2025-07-09 RX ADMIN — METHOCARBAMOL 750 MILLIGRAM(S): 500 TABLET, FILM COATED ORAL at 20:18

## 2025-07-09 RX ADMIN — Medication 5 MILLIGRAM(S): at 22:20

## 2025-07-09 RX ADMIN — LIDOCAINE HYDROCHLORIDE 1 PATCH: 20 JELLY TOPICAL at 08:15

## 2025-07-09 RX ADMIN — ERTAPENEM SODIUM 100 MILLIGRAM(S): 1 INJECTION, POWDER, LYOPHILIZED, FOR SOLUTION INTRAMUSCULAR; INTRAVENOUS at 08:00

## 2025-07-09 RX ADMIN — Medication 100 MILLIGRAM(S): at 08:00

## 2025-07-09 RX ADMIN — GABAPENTIN 300 MILLIGRAM(S): 400 CAPSULE ORAL at 12:44

## 2025-07-09 NOTE — PROGRESS NOTE ADULT - ATTENDING COMMENTS
74 with obstructive jaundice, found to have a 4.5cm pancreatic mass    Imaging and labs reviewed  Resectable/borderline resectable pancreatic head mass with abutment of SMV and aorta    PLAN  -Appreciate GI input, agree with plan for EUS/ +/- ERCP, and biopsy  -Staging Chest CT  -CEA, CA 19-9  -Will follow and coordinate outpatient follow up with [facility] resources upon disharge .

## 2025-07-09 NOTE — PROGRESS NOTE ADULT - TIME BILLING
I spent 40 minutes reviewing the patient's chart, labs, imaging, interviewing and examining patient, and discussing plan of care with the patient, resident/PA team, and other providers, excluding separately billable procedures and teaching time.
Time spent reviewing the chart documentation, reviewing labs and imaging studies, evaluating the patient, discussing the plan of care with the consultants & medical team, and documenting.

## 2025-07-09 NOTE — PROGRESS NOTE ADULT - ASSESSMENT
75 y/o M w/ PMH of HLD, HTN (per pt and fiance is white coat hypertension), B12 deficiency, chronic back pain s/p lumbar fusion, previous opioid/cocaine abuse, ?Hx hep B admitted for pancreatic mass    #4.5 cm pancreatic mass  -sp ercp w biopsy  -surgery and GI following  -f/u CEA and C19-9  -continue protonix 40mg daily  -will need outpt gi, onc, surgery fu    #?Hx Hep B  -per pt was told years ago "one of his blood results came up positive for hep B something"  -fu hep panel    #chronic back pain  #Hx opioid abuse  -continue home robaxin 750mg TID PRN  -continue home gabapentin 300mg daily  -lidocaine patch daily  -avoid opioids if possible    #HTN  #HLD  -not on any home meds  -1 episode of HTN urgency 208/117. Given 5mg IV hydralazine and acetaminophen as pt was in back pain at that time- resolved  -continue to monitor    DVT PPX:scd  diet: regular  dispo: anticipate 1-2d in hospital

## 2025-07-10 ENCOUNTER — TRANSCRIPTION ENCOUNTER (OUTPATIENT)
Age: 75
End: 2025-07-10

## 2025-07-10 VITALS
RESPIRATION RATE: 19 BRPM | SYSTOLIC BLOOD PRESSURE: 157 MMHG | DIASTOLIC BLOOD PRESSURE: 85 MMHG | OXYGEN SATURATION: 99 % | TEMPERATURE: 98 F | HEART RATE: 76 BPM

## 2025-07-10 LAB
ALBUMIN SERPL ELPH-MCNC: 3.7 G/DL — SIGNIFICANT CHANGE UP (ref 3.3–5.2)
ALP SERPL-CCNC: 570 U/L — HIGH (ref 40–120)
ALT FLD-CCNC: 532 U/L — HIGH
ANION GAP SERPL CALC-SCNC: 15 MMOL/L — SIGNIFICANT CHANGE UP (ref 5–17)
AST SERPL-CCNC: 402 U/L — HIGH
BILIRUB SERPL-MCNC: 3.3 MG/DL — HIGH (ref 0.4–2)
BUN SERPL-MCNC: 11.8 MG/DL — SIGNIFICANT CHANGE UP (ref 8–20)
CALCIUM SERPL-MCNC: 9.3 MG/DL — SIGNIFICANT CHANGE UP (ref 8.4–10.5)
CHLORIDE SERPL-SCNC: 103 MMOL/L — SIGNIFICANT CHANGE UP (ref 96–108)
CO2 SERPL-SCNC: 21 MMOL/L — LOW (ref 22–29)
CREAT SERPL-MCNC: 0.68 MG/DL — SIGNIFICANT CHANGE UP (ref 0.5–1.3)
EGFR: 99 ML/MIN/1.73M2 — SIGNIFICANT CHANGE UP
EGFR: 99 ML/MIN/1.73M2 — SIGNIFICANT CHANGE UP
GLUCOSE SERPL-MCNC: 123 MG/DL — HIGH (ref 70–99)
HCT VFR BLD CALC: 39.4 % — SIGNIFICANT CHANGE UP (ref 39–50)
HGB BLD-MCNC: 13.2 G/DL — SIGNIFICANT CHANGE UP (ref 13–17)
MCHC RBC-ENTMCNC: 30.8 PG — SIGNIFICANT CHANGE UP (ref 27–34)
MCHC RBC-ENTMCNC: 33.5 G/DL — SIGNIFICANT CHANGE UP (ref 32–36)
MCV RBC AUTO: 92.1 FL — SIGNIFICANT CHANGE UP (ref 80–100)
NRBC # BLD AUTO: 0 K/UL — SIGNIFICANT CHANGE UP (ref 0–0)
NRBC # FLD: 0 K/UL — SIGNIFICANT CHANGE UP (ref 0–0)
NRBC BLD AUTO-RTO: 0 /100 WBCS — SIGNIFICANT CHANGE UP (ref 0–0)
PLATELET # BLD AUTO: 184 K/UL — SIGNIFICANT CHANGE UP (ref 150–400)
PMV BLD: 11.9 FL — SIGNIFICANT CHANGE UP (ref 7–13)
POTASSIUM SERPL-MCNC: 3.4 MMOL/L — LOW (ref 3.5–5.3)
POTASSIUM SERPL-SCNC: 3.4 MMOL/L — LOW (ref 3.5–5.3)
PROT SERPL-MCNC: 6.9 G/DL — SIGNIFICANT CHANGE UP (ref 6.6–8.7)
RBC # BLD: 4.28 M/UL — SIGNIFICANT CHANGE UP (ref 4.2–5.8)
RBC # FLD: 13.2 % — SIGNIFICANT CHANGE UP (ref 10.3–14.5)
SODIUM SERPL-SCNC: 139 MMOL/L — SIGNIFICANT CHANGE UP (ref 135–145)
WBC # BLD: 11.48 K/UL — HIGH (ref 3.8–10.5)
WBC # FLD AUTO: 11.48 K/UL — HIGH (ref 3.8–10.5)

## 2025-07-10 PROCEDURE — C1874: CPT

## 2025-07-10 PROCEDURE — 99285 EMERGENCY DEPT VISIT HI MDM: CPT | Mod: 25

## 2025-07-10 PROCEDURE — 74330 X-RAY BILE/PANC ENDOSCOPY: CPT

## 2025-07-10 PROCEDURE — 36415 COLL VENOUS BLD VENIPUNCTURE: CPT

## 2025-07-10 PROCEDURE — 85027 COMPLETE CBC AUTOMATED: CPT

## 2025-07-10 PROCEDURE — 85025 COMPLETE CBC W/AUTO DIFF WBC: CPT

## 2025-07-10 PROCEDURE — 86900 BLOOD TYPING SEROLOGIC ABO: CPT

## 2025-07-10 PROCEDURE — 99232 SBSQ HOSP IP/OBS MODERATE 35: CPT

## 2025-07-10 PROCEDURE — 86901 BLOOD TYPING SEROLOGIC RH(D): CPT

## 2025-07-10 PROCEDURE — 80053 COMPREHEN METABOLIC PANEL: CPT

## 2025-07-10 PROCEDURE — 85610 PROTHROMBIN TIME: CPT

## 2025-07-10 PROCEDURE — C1769: CPT

## 2025-07-10 PROCEDURE — 82378 CARCINOEMBRYONIC ANTIGEN: CPT

## 2025-07-10 PROCEDURE — C9399: CPT

## 2025-07-10 PROCEDURE — 86850 RBC ANTIBODY SCREEN: CPT

## 2025-07-10 PROCEDURE — C1773: CPT

## 2025-07-10 PROCEDURE — 85730 THROMBOPLASTIN TIME PARTIAL: CPT

## 2025-07-10 PROCEDURE — 88112 CYTOPATH CELL ENHANCE TECH: CPT

## 2025-07-10 PROCEDURE — 96360 HYDRATION IV INFUSION INIT: CPT

## 2025-07-10 PROCEDURE — 99239 HOSP IP/OBS DSCHRG MGMT >30: CPT

## 2025-07-10 PROCEDURE — 88173 CYTOPATH EVAL FNA REPORT: CPT

## 2025-07-10 PROCEDURE — 80074 ACUTE HEPATITIS PANEL: CPT

## 2025-07-10 PROCEDURE — 71260 CT THORAX DX C+: CPT

## 2025-07-10 PROCEDURE — 86301 IMMUNOASSAY TUMOR CA 19-9: CPT

## 2025-07-10 PROCEDURE — 88305 TISSUE EXAM BY PATHOLOGIST: CPT

## 2025-07-10 RX ORDER — SIMETHICONE 80 MG
80 TABLET,CHEWABLE ORAL ONCE
Refills: 0 | Status: COMPLETED | OUTPATIENT
Start: 2025-07-10 | End: 2025-07-10

## 2025-07-10 RX ADMIN — GABAPENTIN 300 MILLIGRAM(S): 400 CAPSULE ORAL at 12:36

## 2025-07-10 RX ADMIN — LIDOCAINE HYDROCHLORIDE 1 PATCH: 20 JELLY TOPICAL at 07:30

## 2025-07-10 RX ADMIN — Medication 80 MILLIGRAM(S): at 10:50

## 2025-07-10 RX ADMIN — LIDOCAINE HYDROCHLORIDE 1 PATCH: 20 JELLY TOPICAL at 08:00

## 2025-07-10 RX ADMIN — Medication 40 MILLIGRAM(S): at 05:26

## 2025-07-10 NOTE — PROGRESS NOTE ADULT - ASSESSMENT
74 year old male with a PmHx of HLD, hypertriglyceridemia, HTN, vitamin B12 deficiency and chronic back pain (s/p Lumbar fusion) presented to ED after transferred from [facility] radiology finding of pancreatic head mass. S/p EUS/ERCP yesterday with biopsy of pancreatic head mass and stent placed for biliary obstruction. Patient feeling well without signs of post procedure complications- diarrhea likely bile acid from release of obstruction. Labs reviewed and bilirubin downtrending. No further GI interventions  - OK for discharge from GI standpoint  - Await pathology  - PPI QD  - oncology follow up  - CEA/ CA 19-9

## 2025-07-10 NOTE — DISCHARGE NOTE PROVIDER - ATTENDING DISCHARGE PHYSICAL EXAMINATION:
GENERAL: NAD, lying in bed comfortably  HEAD:  Atraumatic, normocephalic  EYES: EOMI, PERRL  NECK: Supple, trachea midline, no JVD  HEART: Regular rate and rhythm  LUNGS: Unlabored respirations.  Clear to auscultation bilaterally, no crackles, wheezing, or rhonchi  ABDOMEN: Soft, nontender, nondistended, +BS  EXTREMITIES: 2+ peripheral pulses bilaterally. No clubbing, cyanosis, or edema  NERVOUS SYSTEM:  A&Ox3, moving all extremities, no focal deficits

## 2025-07-10 NOTE — DISCHARGE NOTE PROVIDER - NSDCMRMEDTOKEN_GEN_ALL_CORE_FT
acetaminophen 325 mg oral tablet: 2 tab(s) orally 3 times a day as needed for  mild pain  gabapentin 300 mg oral capsule: 1 cap(s) orally once a day  lidocaine 4% topical film: Apply topically to affected area once a day to back  methocarbamol 750 mg oral tablet: 1 tab(s) orally 3 times a day as needed for pain  pantoprazole 40 mg oral delayed release tablet: 1 tab(s) orally once a day (before a meal)

## 2025-07-10 NOTE — PROGRESS NOTE ADULT - SUBJECTIVE AND OBJECTIVE BOX
Chief Complaint:  Patient is a 74y old  Male who presents with a chief complaint of pancreatic mass (2025 12:36)      HPI/ 24 hr events:  Patient seen and examined at bedside. Pt feeling well this morning. Tolerating diet. Having some mild diarrhea without fevers, chills, nightsweats. Denies nausea, vomiting, diarrhea, abdominal pain, hematemesis, hematochezia, melena.     REVIEW OF SYSTEMS:   12 point review of systems completed and negative other than as stated in HPI    MEDICATIONS:   MEDICATIONS  (STANDING):  gabapentin 300 milliGRAM(s) Oral daily  lidocaine   4% Patch 1 Patch Transdermal every 24 hours  pantoprazole    Tablet 40 milliGRAM(s) Oral before breakfast  simethicone 80 milliGRAM(s) Chew once    MEDICATIONS  (PRN):  acetaminophen     Tablet .. 650 milliGRAM(s) Oral every 6 hours PRN Temp greater or equal to 38C (100.4F), Mild Pain (1 - 3)  aluminum hydroxide/magnesium hydroxide/simethicone Suspension 30 milliLiter(s) Oral every 4 hours PRN Dyspepsia  melatonin 5 milliGRAM(s) Oral at bedtime PRN Insomnia  methocarbamol 750 milliGRAM(s) Oral three times a day PRN pain  ondansetron Injectable 4 milliGRAM(s) IV Push every 8 hours PRN Nausea and/or Vomiting  oxycodone    5 mG/acetaminophen 325 mG 1 Tablet(s) Oral every 4 hours PRN Severe Pain (7 - 10)       DIET:  Diet, Regular (25 @ 12:35) [Active]  Diet, NPO after Midnight:      NPO Start Date: 2025,   NPO Start Time: 23:59 (25 @ 14:28) [Active]          ALLERGIES:   Allergies    No Known Allergies    Intolerances        PHYSICAL EXAM:   VITAL SIGNS:   Vital Signs Last 24 Hrs  T(C): 36.5 (10 Jul 2025 10:11), Max: 36.6 (10 Jul 2025 04:25)  T(F): 97.7 (10 Jul 2025 10:11), Max: 97.8 (10 Jul 2025 04:25)  HR: 80 (10 Jul 2025 10:11) (75 - 85)  BP: 165/90 (10 Jul 2025 10:11) (143/83 - 168/83)  BP(mean): --  RR: 18 (10 Jul 2025 10:11) (13 - 20)  SpO2: 98% (10 Jul 2025 10:11) (97% - 99%)    Parameters below as of 10 Jul 2025 10:11  Patient On (Oxygen Delivery Method): room air      I&O's Summary    2025 07:01  -  10 Jul 2025 07:00  --------------------------------------------------------  IN: 0 mL / OUT: 2 mL / NET: -2 mL      GENERAL:  No acute distress  HEENT:  NC/AT, conjunctiva clear, sclera anicteric  CHEST:  No increased effort  HEART:  Regular rate  ABDOMEN:  Soft, non-tender, non-distended, normoactive bowel sounds, no rebound or guarding  EXTREMITIES: No edema  SKIN:  Warm, dry  NEURO:  Calm, cooperative    LABS:                        13.2   11.48 )-----------( 184      ( 10 Jul 2025 03:30 )             39.4     07-10    139  |  103  |  11.8  ----------------------------<  123[H]  3.4[L]   |  21.0[L]  |  0.68    Ca    9.3      10 Jul 2025 03:30    TPro  6.9  /  Alb  3.7  /  TBili  3.3[H]  /  DBili  x   /  AST  402[H]  /  ALT  532[H]  /  AlkPhos  570[H]  07-10    LIVER FUNCTIONS - ( 10 Jul 2025 03:30 )  Alb: 3.7 g/dL / Pro: 6.9 g/dL / ALK PHOS: 570 U/L / ALT: 532 U/L / AST: 402 U/L / GGT: x           PT/INR - ( 2025 03:52 )   PT: 13.4 sec;   INR: 1.16 ratio                 RADIOLOGY & ADDITIONAL STUDIES:  I personally reviewed the studies and agree with the radiologists review        ERCP Report        Date: 2025        Patient Name: JOVI CHAVEZ        MRN: [id number]        Account Number:        [id number]        Gender: Male         (age): 1950 (74)        Attending/Fellow:        Sarmad Liriano MD                Procedure Room #:        PROCEDURE ROOM 2        Referring Physician:        JEREMY FORDE        [address] [place] [id number]        [phone] (phone)        [phone] (fax)                ASA Class:        P3 - 2025 12:47 PM Sarmad Liriano MD        History of Present Illness:        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 IV general anesthesia was administered by    anesthesiologist. Continuous pulse oximetry and blood pressure monitoring were    used throughout the procedure. Supplemental oxygen was used. Patient was placed    in supine position. The duodenoscope was introduced through mouth and advanced    under direct visualization until ampulla was reached. Patient tolerance to the    procedure was excellent. The procedure was not difficult. Blood loss was none.        Limitations/Complications:        There were no apparent limitations or complications        Endoscopy Findings:        An upper endoscopy was performed see separate documentation.        ERCP Findings:         radiograph was taken. An upper endoscopy was performed see separate    documentation in electronic medical record. The major papilla appeared edematous    with possible infiltration from pancreatic mass, friable with extensive    anatomical abnormality. A pre-cut sphincterotomy was performed in order to gain    access to the common bile duct. The major papilla was then readily cannulated,    using wire-guided cannulation, using a Jagtome preloaded with a 0.025inch    Jagwire, the common bile duct was cannulated, the wire was seen in the CBD,    cholangiogram was performed confirming location. Cholangiogram concerning for a    filling defect in the distal CBD consistent with at least a 4 cm long stenosis.    The CBD was dilated proximally but otherwise unremarkable. The gallbladder was    notable for multiple stones. Sphincterotomy extension was performed without    bleeding. Yellow stone/sludge debris was removed from the bile duct using a 9-12    mm biliary stone extraction balloon. The duct was then swept multiple times with    9mm and 12 mm balloons. An occlusion cholangiogram was then performed opacifying    the bile duct and patent cystic duct.  Repeat cytology brushing performed.    Pathology pending. Using the previously placed biliary access wire, a 10 mm x 8    cm fully covered self-expanding metal biliary stent was placed into the bile    duct. There was no bleeding at the end of the intervention. Copious bile seen    draining from the ampullary orifice. The pancreatic duct was not manipulated or    injected in this exam. The scope was withdrawn and procedure terminated.    Post-procedure Xray did not show air under the diaphragm.        Fluoroscopic Interpretation:        I supervised the acquisition and interpretation of the fluoroscopic images at    the biliary tree. The quality of the images was good. The total fluoroscopy time    was 2 minutes and 5 seconds.        Impressions:        Biliary obstruction.        choledocholithiasis.        ERCP with stent placement.        Summary:        Biliary sphincterotomy        Stone/sludge removal        Biliary stent placement, 10 mm x 8 cm fully covered self-expanding metal biliary    stent        Bile duct brushed for cytology        Plan:        Await pathology results.        Advance diet as tolerated        Return to floor for further management        Obtain CEA / CA 19-9 / CT chest if not performed for staging purposes        Oncology consultation / Surgical Oncology        Sarmad Liriano MD        Version 2, Electronically signed on 2025 1:16:37 PM by Sarmad Liriano MD  
Subjective:  Pt offers no acute complaints. Denies abdominal pain, chest pain, fever/chills, shortness of breath, nausea, vomiting, diarrhea, headache.     MEDICATIONS  (STANDING):  ertapenem  IVPB 1000 milliGRAM(s) IV Intermittent Once  gabapentin 300 milliGRAM(s) Oral daily  indomethacin Suppository 100 milliGRAM(s) Rectal Once  lidocaine   4% Patch 1 Patch Transdermal every 24 hours  pantoprazole    Tablet 40 milliGRAM(s) Oral before breakfast    MEDICATIONS  (PRN):  acetaminophen     Tablet .. 650 milliGRAM(s) Oral every 6 hours PRN Temp greater or equal to 38C (100.4F), Mild Pain (1 - 3)  aluminum hydroxide/magnesium hydroxide/simethicone Suspension 30 milliLiter(s) Oral every 4 hours PRN Dyspepsia  melatonin 3 milliGRAM(s) Oral at bedtime PRN Insomnia  methocarbamol 750 milliGRAM(s) Oral three times a day PRN pain  ondansetron Injectable 4 milliGRAM(s) IV Push every 8 hours PRN Nausea and/or Vomiting      Vital Signs Last 24 Hrs  T(C): 36.4 (09 Jul 2025 04:22), Max: 36.7 (08 Jul 2025 16:03)  T(F): 97.6 (09 Jul 2025 04:22), Max: 98 (08 Jul 2025 16:03)  HR: 77 (09 Jul 2025 04:22) (69 - 92)  BP: 168/90 (09 Jul 2025 04:22) (154/84 - 208/117)  BP(mean): 119 (08 Jul 2025 16:28) (119 - 119)  RR: 18 (09 Jul 2025 04:22) (17 - 19)  SpO2: 97% (09 Jul 2025 04:22) (97% - 98%)    Parameters below as of 09 Jul 2025 04:22  Patient On (Oxygen Delivery Method): room air        Physical Exam:    Constitutional: NAD  HEENT: PERRL, EOMI  Neck: No JVD, FROM without pain  Respiratory: Respirations non-labored, no accessory muscle use  Gastrointestinal: Soft, without tenderness, non-distended  Neurological: A&O x 3; without gross deficit      LABS:                        13.4   9.19  )-----------( 195      ( 09 Jul 2025 03:52 )             39.3     07-09    139  |  104  |  12.0  ----------------------------<  114[H]  3.5   |  20.0[L]  |  0.53    Ca    9.4      09 Jul 2025 03:52    TPro  6.7  /  Alb  3.8  /  TBili  7.6[H]  /  DBili  x   /  AST  233[H]  /  ALT  412[H]  /  AlkPhos  566[H]  07-09    PT/INR - ( 09 Jul 2025 03:52 )   PT: 13.4 sec;   INR: 1.16 ratio              A: 74M that is being followed by surgery for a pancreatic head mass    Plan:   -GI following and planning for ERCP/EUS today  -Awaiting CEA/  -CT chest for staging  -No plan for urgent surgical intervention
[facility] Division of Hospital Medicine    pt seen and examined at bedside  sp ercp  pt feeling okay, some abd soreness  no ha, dizziness, cp, sob, nausea, vomiting, chills, fevers      MEDICATIONS  (STANDING):  gabapentin 300 milliGRAM(s) Oral daily  lidocaine   4% Patch 1 Patch Transdermal every 24 hours  pantoprazole    Tablet 40 milliGRAM(s) Oral before breakfast    MEDICATIONS  (PRN):  acetaminophen     Tablet .. 650 milliGRAM(s) Oral every 6 hours PRN Temp greater or equal to 38C (100.4F), Mild Pain (1 - 3)  aluminum hydroxide/magnesium hydroxide/simethicone Suspension 30 milliLiter(s) Oral every 4 hours PRN Dyspepsia  melatonin 3 milliGRAM(s) Oral at bedtime PRN Insomnia  methocarbamol 750 milliGRAM(s) Oral three times a day PRN pain  ondansetron Injectable 4 milliGRAM(s) IV Push every 8 hours PRN Nausea and/or Vomiting        I&O's Summary    08 Jul 2025 07:01  -  09 Jul 2025 07:00  --------------------------------------------------------  IN: 0 mL / OUT: 700 mL / NET: -700 mL        PHYSICAL EXAM:  Vital Signs Last 24 Hrs  T(C): 36.3 (09 Jul 2025 10:32), Max: 36.7 (08 Jul 2025 16:03)  T(F): 97.3 (09 Jul 2025 10:32), Max: 98 (08 Jul 2025 16:03)  HR: 75 (09 Jul 2025 10:47) (69 - 92)  BP: 143/83 (09 Jul 2025 10:47) (133/93 - 208/117)  BP(mean): 119 (08 Jul 2025 16:28) (119 - 119)  RR: 13 (09 Jul 2025 10:47) (13 - 22)  SpO2: 97% (09 Jul 2025 10:47) (97% - 100%)    Parameters below as of 09 Jul 2025 07:30  Patient On (Oxygen Delivery Method): room air            GENERAL: NAD, lying in bed comfortably, slightly jaundiced  HEAD:  Atraumatic, normocephalic  EYES: EOMI, PERRL  NECK: Supple, trachea midline, no JVD  HEART: Regular rate and rhythm  LUNGS: Unlabored respirations.  Clear to auscultation bilaterally, no crackles, wheezing, or rhonchi  ABDOMEN: Soft, nontender, nondistended, +BS  EXTREMITIES: 2+ peripheral pulses bilaterally. No clubbing, cyanosis, or edema  NERVOUS SYSTEM:  A&Ox3, moving all extremities, no focal deficits       LABS:                        13.4   9.19  )-----------( 195      ( 09 Jul 2025 03:52 )             39.3     07-09    139  |  104  |  12.0  ----------------------------<  114[H]  3.5   |  20.0[L]  |  0.53    Ca    9.4      09 Jul 2025 03:52    TPro  6.7  /  Alb  3.8  /  TBili  7.6[H]  /  DBili  x   /  AST  233[H]  /  ALT  412[H]  /  AlkPhos  566[H]  07-09    PT/INR - ( 09 Jul 2025 03:52 )   PT: 13.4 sec;   INR: 1.16 ratio         PTT - ( 08 Jul 2025 08:46 )  PTT:32.7 sec      Urinalysis Basic - ( 09 Jul 2025 03:52 )    Color: x / Appearance: x / SG: x / pH: x  Gluc: 114 mg/dL / Ketone: x  / Bili: x / Urobili: x   Blood: x / Protein: x / Nitrite: x   Leuk Esterase: x / RBC: x / WBC x   Sq Epi: x / Non Sq Epi: x / Bacteria: x        CAPILLARY BLOOD GLUCOSE            RADIOLOGY & ADDITIONAL TESTS:  Results Reviewed:   Imaging Personally Reviewed:  Electrocardiogram Personally Reviewed:

## 2025-07-10 NOTE — DISCHARGE NOTE PROVIDER - NSDCCPCAREPLAN_GEN_ALL_CORE_FT
PRINCIPAL DISCHARGE DIAGNOSIS  Diagnosis: Pancreatic mass  Assessment and Plan of Treatment: 73 y/o M w/ PMH of HLD, HTN (per pt and fiance is white coat hypertension), B12 deficiency, chronic back pain s/p lumbar fusion, previous opioid/cocaine abuse, ?Hx hep B admitted for pancreatic mass. Patient seen by gastroenterology in hospital, patient had ERCP/EUS done with biopsy of pancreatic head mass and stent placed for biliary obstruction. Patient is medically stable for discharge. Patient will follow up outpatient with GI, heme/onc, and primary care. No changes being made to chronic home medications.

## 2025-07-10 NOTE — DISCHARGE NOTE NURSING/CASE MANAGEMENT/SOCIAL WORK - PATIENT PORTAL LINK FT
You can access the FollowMyHealth Patient Portal offered by [facility] [place] by registering at the following website: [email or web address]. By joining [employer]’s FollowMyHealth portal, you will also be able to view your health information using other applications (apps) compatible with our system.

## 2025-07-10 NOTE — DISCHARGE NOTE PROVIDER - NSDCFUADDAPPT_GEN_ALL_CORE_FT
APPTS ARE READY TO BE MADE: [X] YES    Best Family or Patient Contact (if needed):    Additional Information about above appointments (if needed):    1: gastroenterology within 2 weeks  2: heme/onc within 2 weeks  3: primary care within 2 weeks    Other comments or requests:

## 2025-07-10 NOTE — DISCHARGE NOTE NURSING/CASE MANAGEMENT/SOCIAL WORK - FINANCIAL ASSISTANCE
[facility] provides services at a reduced cost to those who are determined to be eligible through [facility]’s financial assistance program. Information regarding [facility]’s financial assistance program can be found by going to [email or web address] or by calling [phone].

## 2025-07-10 NOTE — DISCHARGE NOTE PROVIDER - HOSPITAL COURSE
75 y/o M w/ PMH of HLD, HTN (per pt and fiance is white coat hypertension), B12 deficiency, chronic back pain s/p lumbar fusion, previous opioid/cocaine abuse, ?Hx hep B admitted for pancreatic mass. Patient seen by gastroenterology in hospital, patient had ERCP/EUS done with biopsy of pancreatic head mass and stent placed for biliary obstruction. Patient is medically stable for discharge. Patient will follow up outpatient with GI, heme/onc, and primary care. No changes being made to chronic home medications. 73 y/o M w/ PMH of HLD, HTN (per pt and fiance is white coat hypertension), B12 deficiency, chronic back pain s/p lumbar fusion, previous opioid/cocaine abuse, ?Hx hep B admitted for pancreatic mass. Patient seen by gastroenterology in hospital, patient had ERCP/EUS done with biopsy of pancreatic head mass and stent placed for biliary obstruction. CT chest was done which did not show evidence of metastates, however patient with sub cm nodule present for which he can continue surveillance with his pcp. Patient is medically stable for discharge. Patient will follow up outpatient with GI, heme/onc, and primary care. No changes being made to chronic home medications.

## 2025-07-14 ENCOUNTER — OUTPATIENT (OUTPATIENT)
Dept: OUTPATIENT SERVICES | Facility: HOSPITAL | Age: 75
LOS: 1 days | Discharge: HOME | End: 2025-07-14
Payer: MEDICARE

## 2025-07-14 DIAGNOSIS — Z90.89 ACQUIRED ABSENCE OF OTHER ORGANS: Chronic | ICD-10-CM

## 2025-07-14 DIAGNOSIS — Z98.1 ARTHRODESIS STATUS: Chronic | ICD-10-CM

## 2025-07-14 DIAGNOSIS — Z98.890 OTHER SPECIFIED POSTPROCEDURAL STATES: Chronic | ICD-10-CM

## 2025-07-14 DIAGNOSIS — D64.9 ANEMIA, UNSPECIFIED: ICD-10-CM

## 2025-07-14 PROBLEM — M54.9 DORSALGIA, UNSPECIFIED: Chronic | Status: ACTIVE | Noted: 2025-07-08

## 2025-07-14 PROBLEM — E78.5 HYPERLIPIDEMIA, UNSPECIFIED: Chronic | Status: ACTIVE | Noted: 2025-07-08

## 2025-07-14 PROBLEM — I10 ESSENTIAL (PRIMARY) HYPERTENSION: Chronic | Status: ACTIVE | Noted: 2025-07-08

## 2025-07-14 LAB — NON-GYNECOLOGICAL CYTOLOGY STUDY: SIGNIFICANT CHANGE UP

## 2025-07-15 ENCOUNTER — APPOINTMENT (OUTPATIENT)
Dept: HEMATOLOGY ONCOLOGY | Facility: CLINIC | Age: 75
End: 2025-07-15
Payer: MEDICARE

## 2025-07-15 ENCOUNTER — APPOINTMENT (OUTPATIENT)
Dept: SURGICAL ONCOLOGY | Facility: CLINIC | Age: 75
End: 2025-07-15
Payer: MEDICARE

## 2025-07-15 ENCOUNTER — NON-APPOINTMENT (OUTPATIENT)
Age: 75
End: 2025-07-15

## 2025-07-15 VITALS
HEART RATE: 85 BPM | WEIGHT: 184.19 LBS | HEIGHT: 74 IN | BODY MASS INDEX: 23.64 KG/M2 | TEMPERATURE: 98.3 F | OXYGEN SATURATION: 97 % | SYSTOLIC BLOOD PRESSURE: 128 MMHG | DIASTOLIC BLOOD PRESSURE: 71 MMHG

## 2025-07-15 PROCEDURE — G2211 COMPLEX E/M VISIT ADD ON: CPT

## 2025-07-15 PROCEDURE — 93010 ELECTROCARDIOGRAM REPORT: CPT

## 2025-07-15 PROCEDURE — 99215 OFFICE O/P EST HI 40 MIN: CPT

## 2025-07-15 PROCEDURE — 99205 OFFICE O/P NEW HI 60 MIN: CPT

## 2025-07-16 ENCOUNTER — NON-APPOINTMENT (OUTPATIENT)
Age: 75
End: 2025-07-16

## 2025-07-16 LAB
ALBUMIN SERPL ELPH-MCNC: 4 G/DL
ALP BLD-CCNC: 290 U/L
ALT SERPL-CCNC: 161 U/L
ANION GAP SERPL CALC-SCNC: 12 MMOL/L
AST SERPL-CCNC: 51 U/L
BASOPHILS # BLD AUTO: 0.1 K/UL
BASOPHILS NFR BLD AUTO: 0.8 %
BILIRUB SERPL-MCNC: 1.3 MG/DL
BUN SERPL-MCNC: 23 MG/DL
CALCIUM SERPL-MCNC: 9.7 MG/DL
CANCER AG19-9 SERPL-ACNC: 1844 U/ML
CHLORIDE SERPL-SCNC: 103 MMOL/L
CO2 SERPL-SCNC: 26 MMOL/L
CREAT SERPL-MCNC: 0.88 MG/DL
EGFRCR SERPLBLD CKD-EPI 2021: 91 ML/MIN/1.73M2
EOSINOPHIL # BLD AUTO: 0.61 K/UL
EOSINOPHIL NFR BLD AUTO: 4.9 %
GLUCOSE SERPL-MCNC: 92 MG/DL
HAV IGM SER QL: NONREACTIVE
HBV CORE IGG+IGM SER QL: REACTIVE
HBV CORE IGM SER QL: NONREACTIVE
HBV SURFACE AB SER QL: REACTIVE
HBV SURFACE AG SER QL: NONREACTIVE
HCT VFR BLD CALC: 38.9 %
HCV AB SER QL: NONREACTIVE
HCV S/CO RATIO: 0.11 S/CO
HGB BLD-MCNC: 12.3 G/DL
IMM GRANULOCYTES NFR BLD AUTO: 0.4 %
INR PPP: 1.05 RATIO
LYMPHOCYTES # BLD AUTO: 2.73 K/UL
LYMPHOCYTES NFR BLD AUTO: 22.1 %
MAGNESIUM SERPL-MCNC: 2.2 MG/DL
MAN DIFF?: NORMAL
MCHC RBC-ENTMCNC: 31.4 PG
MCHC RBC-ENTMCNC: 31.6 G/DL
MCV RBC AUTO: 99.2 FL
MONOCYTES # BLD AUTO: 1.1 K/UL
MONOCYTES NFR BLD AUTO: 8.9 %
NEUTROPHILS # BLD AUTO: 7.76 K/UL
NEUTROPHILS NFR BLD AUTO: 62.9 %
PLATELET # BLD AUTO: 232 K/UL
POTASSIUM SERPL-SCNC: 3.9 MMOL/L
PROT SERPL-MCNC: 6.6 G/DL
PT BLD: 12.4 SEC
RBC # BLD: 3.92 M/UL
RBC # FLD: 13.2 %
SODIUM SERPL-SCNC: 140 MMOL/L
WBC # FLD AUTO: 12.35 K/UL

## 2025-07-16 RX ORDER — OLANZAPINE 5 MG/1
5 TABLET, FILM COATED ORAL
Qty: 30 | Refills: 5 | Status: ACTIVE | COMMUNITY
Start: 2025-07-16 | End: 1900-01-01

## 2025-07-16 RX ORDER — ONDANSETRON 8 MG/1
8 TABLET, ORALLY DISINTEGRATING ORAL EVERY 8 HOURS
Qty: 90 | Refills: 3 | Status: ACTIVE | COMMUNITY
Start: 2025-07-16 | End: 1900-01-01

## 2025-07-17 ENCOUNTER — NON-APPOINTMENT (OUTPATIENT)
Age: 75
End: 2025-07-17

## 2025-07-21 ENCOUNTER — OUTPATIENT (OUTPATIENT)
Dept: OUTPATIENT SERVICES | Facility: HOSPITAL | Age: 75
LOS: 1 days | End: 2025-07-21
Payer: MEDICARE

## 2025-07-21 DIAGNOSIS — Z98.1 ARTHRODESIS STATUS: Chronic | ICD-10-CM

## 2025-07-21 DIAGNOSIS — D64.9 ANEMIA, UNSPECIFIED: ICD-10-CM

## 2025-07-21 DIAGNOSIS — Z90.89 ACQUIRED ABSENCE OF OTHER ORGANS: Chronic | ICD-10-CM

## 2025-07-21 DIAGNOSIS — Z98.890 OTHER SPECIFIED POSTPROCEDURAL STATES: Chronic | ICD-10-CM

## 2025-07-22 ENCOUNTER — APPOINTMENT (OUTPATIENT)
Dept: HEMATOLOGY ONCOLOGY | Facility: CLINIC | Age: 75
End: 2025-07-22
Payer: MEDICARE

## 2025-07-22 ENCOUNTER — NON-APPOINTMENT (OUTPATIENT)
Age: 75
End: 2025-07-22

## 2025-07-22 VITALS
BODY MASS INDEX: 23.54 KG/M2 | TEMPERATURE: 98.2 F | SYSTOLIC BLOOD PRESSURE: 107 MMHG | DIASTOLIC BLOOD PRESSURE: 71 MMHG | WEIGHT: 183.4 LBS | HEART RATE: 84 BPM | HEIGHT: 74 IN | OXYGEN SATURATION: 96 %

## 2025-07-22 DIAGNOSIS — C25.9 MALIGNANT NEOPLASM OF PANCREAS, UNSPECIFIED: ICD-10-CM

## 2025-07-22 PROCEDURE — 99215 OFFICE O/P EST HI 40 MIN: CPT

## 2025-07-22 PROCEDURE — 99417 PROLNG OP E/M EACH 15 MIN: CPT

## 2025-07-22 PROCEDURE — G2212 PROLONG OUTPT/OFFICE VIS: CPT

## 2025-07-22 PROCEDURE — G2211 COMPLEX E/M VISIT ADD ON: CPT

## 2025-07-28 ENCOUNTER — NON-APPOINTMENT (OUTPATIENT)
Age: 75
End: 2025-07-28

## 2025-07-29 ENCOUNTER — APPOINTMENT (OUTPATIENT)
Dept: HEMATOLOGY ONCOLOGY | Facility: CLINIC | Age: 75
End: 2025-07-29

## 2025-07-29 VITALS
SYSTOLIC BLOOD PRESSURE: 118 MMHG | OXYGEN SATURATION: 97 % | DIASTOLIC BLOOD PRESSURE: 67 MMHG | TEMPERATURE: 97.1 F | BODY MASS INDEX: 24.33 KG/M2 | HEIGHT: 72.76 IN | HEART RATE: 77 BPM | WEIGHT: 183.6 LBS

## 2025-07-29 RX ORDER — METHOCARBAMOL 1000 MG/1
TABLET, COATED ORAL
Refills: 0 | Status: ACTIVE | COMMUNITY

## 2025-07-30 RX ORDER — LIDOCAINE HYDROCHLORIDE 20 MG/ML
1 JELLY TOPICAL
Refills: 0 | DISCHARGE

## 2025-07-30 RX ORDER — GABAPENTIN 400 MG/1
1 CAPSULE ORAL
Refills: 0 | DISCHARGE

## 2025-07-30 RX ORDER — ACETAMINOPHEN 500 MG/5ML
2 LIQUID (ML) ORAL
Refills: 0 | DISCHARGE

## 2025-07-30 RX ORDER — METHOCARBAMOL 500 MG/1
1 TABLET, FILM COATED ORAL
Refills: 0 | DISCHARGE

## 2025-07-31 ENCOUNTER — RESULT REVIEW (OUTPATIENT)
Age: 75
End: 2025-07-31

## 2025-08-05 ENCOUNTER — RESULT REVIEW (OUTPATIENT)
Age: 75
End: 2025-08-05

## 2025-08-05 ENCOUNTER — APPOINTMENT (OUTPATIENT)
Dept: INFUSION THERAPY | Facility: CANCER CENTER | Age: 75
End: 2025-08-05

## 2025-08-05 LAB
ALBUMIN SERPL ELPH-MCNC: 3.7 G/DL — SIGNIFICANT CHANGE UP (ref 3.3–5)
ALP SERPL-CCNC: 101 U/L — SIGNIFICANT CHANGE UP (ref 40–120)
ALT FLD-CCNC: 19 U/L — SIGNIFICANT CHANGE UP (ref 10–50)
ANION GAP SERPL CALC-SCNC: 10 MMOL/L — SIGNIFICANT CHANGE UP (ref 5–17)
AST SERPL-CCNC: 26 U/L — SIGNIFICANT CHANGE UP (ref 10–40)
BASOPHILS # BLD AUTO: 0.08 K/UL — SIGNIFICANT CHANGE UP (ref 0–0.2)
BASOPHILS NFR BLD AUTO: 1 % — SIGNIFICANT CHANGE UP (ref 0–2)
BILIRUB SERPL-MCNC: 0.6 MG/DL — SIGNIFICANT CHANGE UP (ref 0.2–1.2)
BUN SERPL-MCNC: 25 MG/DL — HIGH (ref 7–23)
CALCIUM SERPL-MCNC: 9.2 MG/DL — SIGNIFICANT CHANGE UP (ref 8.4–10.5)
CANCER AG19-9 SERPL-ACNC: 1365 U/ML — HIGH
CEA SERPL-MCNC: 6.8 NG/ML — HIGH (ref 0–3.8)
CHLORIDE SERPL-SCNC: 104 MMOL/L — SIGNIFICANT CHANGE UP (ref 96–108)
CO2 SERPL-SCNC: 25 MMOL/L — SIGNIFICANT CHANGE UP (ref 22–31)
CREAT SERPL-MCNC: 0.78 MG/DL — SIGNIFICANT CHANGE UP (ref 0.5–1.3)
EGFR: 95 ML/MIN/1.73M2 — SIGNIFICANT CHANGE UP
EGFR: 95 ML/MIN/1.73M2 — SIGNIFICANT CHANGE UP
EOSINOPHIL # BLD AUTO: 0.56 K/UL — HIGH (ref 0–0.5)
EOSINOPHIL NFR BLD AUTO: 7.2 % — HIGH (ref 0–6)
GLUCOSE SERPL-MCNC: 108 MG/DL — HIGH (ref 70–99)
HCT VFR BLD CALC: 30.9 % — LOW (ref 39–50)
HGB BLD-MCNC: 10.2 G/DL — LOW (ref 13–17)
IMM GRANULOCYTES # BLD AUTO: 0.03 K/UL — SIGNIFICANT CHANGE UP (ref 0–0.07)
IMM GRANULOCYTES NFR BLD AUTO: 0.4 % — SIGNIFICANT CHANGE UP (ref 0–0.9)
LYMPHOCYTES # BLD AUTO: 1.71 K/UL — SIGNIFICANT CHANGE UP (ref 1–3.3)
LYMPHOCYTES NFR BLD AUTO: 21.9 % — SIGNIFICANT CHANGE UP (ref 13–44)
MCHC RBC-ENTMCNC: 30.3 PG — SIGNIFICANT CHANGE UP (ref 27–34)
MCHC RBC-ENTMCNC: 33 G/DL — SIGNIFICANT CHANGE UP (ref 32–36)
MCV RBC AUTO: 91.7 FL — SIGNIFICANT CHANGE UP (ref 80–100)
MONOCYTES # BLD AUTO: 0.93 K/UL — HIGH (ref 0–0.9)
MONOCYTES NFR BLD AUTO: 11.9 % — SIGNIFICANT CHANGE UP (ref 2–14)
NEUTROPHILS # BLD AUTO: 4.49 K/UL — SIGNIFICANT CHANGE UP (ref 1.8–7.4)
NEUTROPHILS NFR BLD AUTO: 57.6 % — SIGNIFICANT CHANGE UP (ref 43–77)
NRBC # BLD AUTO: 0 K/UL — SIGNIFICANT CHANGE UP (ref 0–0)
NRBC # FLD: 0 K/UL — SIGNIFICANT CHANGE UP (ref 0–0)
NRBC BLD AUTO-RTO: 0 /100 WBCS — SIGNIFICANT CHANGE UP (ref 0–0)
PLATELET # BLD AUTO: 159 K/UL — SIGNIFICANT CHANGE UP (ref 150–400)
PMV BLD: 10.8 FL — SIGNIFICANT CHANGE UP (ref 7–13)
POTASSIUM SERPL-MCNC: 4.1 MMOL/L — SIGNIFICANT CHANGE UP (ref 3.5–5.3)
POTASSIUM SERPL-SCNC: 4.1 MMOL/L — SIGNIFICANT CHANGE UP (ref 3.5–5.3)
PROT SERPL-MCNC: 6.4 G/DL — SIGNIFICANT CHANGE UP (ref 6–8.3)
RBC # BLD: 3.37 M/UL — LOW (ref 4.2–5.8)
RBC # FLD: 12.5 % — SIGNIFICANT CHANGE UP (ref 10.3–14.5)
SODIUM SERPL-SCNC: 139 MMOL/L — SIGNIFICANT CHANGE UP (ref 135–145)
WBC # BLD: 7.8 K/UL — SIGNIFICANT CHANGE UP (ref 3.8–10.5)
WBC # FLD AUTO: 7.8 K/UL — SIGNIFICANT CHANGE UP (ref 3.8–10.5)

## 2025-08-05 RX ORDER — PROCHLORPERAZINE MALEATE 10 MG/1
10 TABLET ORAL
Qty: 30 | Refills: 2 | Status: ACTIVE | COMMUNITY
Start: 2025-07-29 | End: 1900-01-01

## 2025-08-06 ENCOUNTER — APPOINTMENT (OUTPATIENT)
Dept: INFUSION THERAPY | Facility: CANCER CENTER | Age: 75
End: 2025-08-06

## 2025-08-06 ENCOUNTER — RESULT REVIEW (OUTPATIENT)
Age: 75
End: 2025-08-06

## 2025-08-06 DIAGNOSIS — C25.9 MALIGNANT NEOPLASM OF PANCREAS, UNSPECIFIED: ICD-10-CM

## 2025-08-06 DIAGNOSIS — Z51.11 ENCOUNTER FOR ANTINEOPLASTIC CHEMOTHERAPY: ICD-10-CM

## 2025-08-06 DIAGNOSIS — R11.2 NAUSEA WITH VOMITING, UNSPECIFIED: ICD-10-CM

## 2025-08-06 LAB
ALBUMIN SERPL ELPH-MCNC: 3.9 G/DL
ALP BLD-CCNC: 99 U/L
ALT SERPL-CCNC: 22 U/L
ANION GAP SERPL CALC-SCNC: 11 MMOL/L
AST SERPL-CCNC: 23 U/L
BASOPHILS # BLD AUTO: 0.03 K/UL — SIGNIFICANT CHANGE UP (ref 0–0.2)
BASOPHILS NFR BLD AUTO: 0.2 % — SIGNIFICANT CHANGE UP (ref 0–2)
BILIRUB SERPL-MCNC: 0.5 MG/DL
BUN SERPL-MCNC: 24 MG/DL
CALCIUM SERPL-MCNC: 9.4 MG/DL
CHLORIDE SERPL-SCNC: 104 MMOL/L
CO2 SERPL-SCNC: 24 MMOL/L
CREAT SERPL-MCNC: 0.6 MG/DL
EGFRCR SERPLBLD CKD-EPI 2021: 103 ML/MIN/1.73M2
EOSINOPHIL # BLD AUTO: 0.01 K/UL — SIGNIFICANT CHANGE UP (ref 0–0.5)
EOSINOPHIL NFR BLD AUTO: 0.1 % — SIGNIFICANT CHANGE UP (ref 0–6)
GLUCOSE SERPL-MCNC: 157 MG/DL
HCT VFR BLD CALC: 32.2 % — LOW (ref 39–50)
HGB BLD-MCNC: 10.8 G/DL — LOW (ref 13–17)
IMM GRANULOCYTES # BLD AUTO: 0.09 K/UL — HIGH (ref 0–0.07)
IMM GRANULOCYTES NFR BLD AUTO: 0.6 % — SIGNIFICANT CHANGE UP (ref 0–0.9)
LYMPHOCYTES # BLD AUTO: 0.82 K/UL — LOW (ref 1–3.3)
LYMPHOCYTES NFR BLD AUTO: 5.1 % — LOW (ref 13–44)
MAGNESIUM SERPL-MCNC: 1.9 MG/DL
MCHC RBC-ENTMCNC: 30.3 PG — SIGNIFICANT CHANGE UP (ref 27–34)
MCHC RBC-ENTMCNC: 33.5 G/DL — SIGNIFICANT CHANGE UP (ref 32–36)
MCV RBC AUTO: 90.2 FL — SIGNIFICANT CHANGE UP (ref 80–100)
MONOCYTES # BLD AUTO: 1.03 K/UL — HIGH (ref 0–0.9)
MONOCYTES NFR BLD AUTO: 6.4 % — SIGNIFICANT CHANGE UP (ref 2–14)
NEUTROPHILS # BLD AUTO: 14.2 K/UL — HIGH (ref 1.8–7.4)
NEUTROPHILS NFR BLD AUTO: 87.6 % — HIGH (ref 43–77)
NRBC # BLD AUTO: 0 K/UL — SIGNIFICANT CHANGE UP (ref 0–0)
NRBC # FLD: 0 K/UL — SIGNIFICANT CHANGE UP (ref 0–0)
NRBC BLD AUTO-RTO: 0 /100 WBCS — SIGNIFICANT CHANGE UP (ref 0–0)
PLATELET # BLD AUTO: 172 K/UL — SIGNIFICANT CHANGE UP (ref 150–400)
PMV BLD: 10.9 FL — SIGNIFICANT CHANGE UP (ref 7–13)
POTASSIUM SERPL-SCNC: 4.3 MMOL/L
PROT SERPL-MCNC: 6.7 G/DL
RBC # BLD: 3.57 M/UL — LOW (ref 4.2–5.8)
RBC # FLD: 12.2 % — SIGNIFICANT CHANGE UP (ref 10.3–14.5)
SODIUM SERPL-SCNC: 139 MMOL/L
WBC # BLD: 16.18 K/UL — HIGH (ref 3.8–10.5)
WBC # FLD AUTO: 16.18 K/UL — HIGH (ref 3.8–10.5)

## 2025-08-06 PROCEDURE — 85025 COMPLETE CBC W/AUTO DIFF WBC: CPT

## 2025-08-06 PROCEDURE — 96375 TX/PRO/DX INJ NEW DRUG ADDON: CPT

## 2025-08-06 PROCEDURE — 82378 CARCINOEMBRYONIC ANTIGEN: CPT

## 2025-08-06 PROCEDURE — 96415 CHEMO IV INFUSION ADDL HR: CPT

## 2025-08-06 PROCEDURE — 96417 CHEMO IV INFUS EACH ADDL SEQ: CPT

## 2025-08-06 PROCEDURE — 96416 CHEMO PROLONG INFUSE W/PUMP: CPT

## 2025-08-06 PROCEDURE — 86301 IMMUNOASSAY TUMOR CA 19-9: CPT

## 2025-08-06 PROCEDURE — 80053 COMPREHEN METABOLIC PANEL: CPT

## 2025-08-06 PROCEDURE — 96368 THER/DIAG CONCURRENT INF: CPT

## 2025-08-06 PROCEDURE — 36415 COLL VENOUS BLD VENIPUNCTURE: CPT

## 2025-08-06 PROCEDURE — 96413 CHEMO IV INFUSION 1 HR: CPT

## 2025-08-06 PROCEDURE — 96367 TX/PROPH/DG ADDL SEQ IV INF: CPT

## 2025-08-06 RX ORDER — LORAZEPAM 1 MG/1
1 TABLET ORAL 3 TIMES DAILY
Qty: 30 | Refills: 0 | Status: ACTIVE | COMMUNITY
Start: 2025-08-06 | End: 1900-01-01

## 2025-08-07 ENCOUNTER — APPOINTMENT (OUTPATIENT)
Dept: INFUSION THERAPY | Facility: CANCER CENTER | Age: 75
End: 2025-08-07

## 2025-08-07 PROCEDURE — 96367 TX/PROPH/DG ADDL SEQ IV INF: CPT

## 2025-08-07 PROCEDURE — 86301 IMMUNOASSAY TUMOR CA 19-9: CPT

## 2025-08-07 PROCEDURE — 36415 COLL VENOUS BLD VENIPUNCTURE: CPT

## 2025-08-07 PROCEDURE — 96368 THER/DIAG CONCURRENT INF: CPT

## 2025-08-07 PROCEDURE — 96416 CHEMO PROLONG INFUSE W/PUMP: CPT

## 2025-08-07 PROCEDURE — 96415 CHEMO IV INFUSION ADDL HR: CPT

## 2025-08-07 PROCEDURE — 96365 THER/PROPH/DIAG IV INF INIT: CPT

## 2025-08-07 PROCEDURE — 82378 CARCINOEMBRYONIC ANTIGEN: CPT

## 2025-08-07 PROCEDURE — 80053 COMPREHEN METABOLIC PANEL: CPT

## 2025-08-07 PROCEDURE — 96417 CHEMO IV INFUS EACH ADDL SEQ: CPT

## 2025-08-07 PROCEDURE — 85025 COMPLETE CBC W/AUTO DIFF WBC: CPT

## 2025-08-07 PROCEDURE — 96366 THER/PROPH/DIAG IV INF ADDON: CPT

## 2025-08-07 PROCEDURE — 96375 TX/PRO/DX INJ NEW DRUG ADDON: CPT

## 2025-08-07 PROCEDURE — 96413 CHEMO IV INFUSION 1 HR: CPT

## 2025-08-08 ENCOUNTER — APPOINTMENT (OUTPATIENT)
Dept: INFUSION THERAPY | Facility: CANCER CENTER | Age: 75
End: 2025-08-08

## 2025-08-08 PROCEDURE — 96372 THER/PROPH/DIAG INJ SC/IM: CPT

## 2025-08-08 PROCEDURE — 96367 TX/PROPH/DG ADDL SEQ IV INF: CPT

## 2025-08-08 PROCEDURE — 96366 THER/PROPH/DIAG IV INF ADDON: CPT

## 2025-08-08 PROCEDURE — 86301 IMMUNOASSAY TUMOR CA 19-9: CPT

## 2025-08-08 PROCEDURE — 96368 THER/DIAG CONCURRENT INF: CPT

## 2025-08-08 PROCEDURE — 96413 CHEMO IV INFUSION 1 HR: CPT

## 2025-08-08 PROCEDURE — 82378 CARCINOEMBRYONIC ANTIGEN: CPT

## 2025-08-08 PROCEDURE — 96417 CHEMO IV INFUS EACH ADDL SEQ: CPT

## 2025-08-08 PROCEDURE — 96415 CHEMO IV INFUSION ADDL HR: CPT

## 2025-08-08 PROCEDURE — 96416 CHEMO PROLONG INFUSE W/PUMP: CPT

## 2025-08-08 PROCEDURE — 85025 COMPLETE CBC W/AUTO DIFF WBC: CPT

## 2025-08-08 PROCEDURE — 96375 TX/PRO/DX INJ NEW DRUG ADDON: CPT

## 2025-08-08 PROCEDURE — 36415 COLL VENOUS BLD VENIPUNCTURE: CPT

## 2025-08-08 PROCEDURE — 96365 THER/PROPH/DIAG IV INF INIT: CPT

## 2025-08-08 PROCEDURE — 96361 HYDRATE IV INFUSION ADD-ON: CPT

## 2025-08-08 PROCEDURE — 80053 COMPREHEN METABOLIC PANEL: CPT

## 2025-08-11 ENCOUNTER — RESULT REVIEW (OUTPATIENT)
Age: 75
End: 2025-08-11

## 2025-08-11 ENCOUNTER — APPOINTMENT (OUTPATIENT)
Dept: INFUSION THERAPY | Facility: CANCER CENTER | Age: 75
End: 2025-08-11

## 2025-08-11 DIAGNOSIS — E86.0 DEHYDRATION: ICD-10-CM

## 2025-08-11 PROCEDURE — 96372 THER/PROPH/DIAG INJ SC/IM: CPT

## 2025-08-11 PROCEDURE — 36415 COLL VENOUS BLD VENIPUNCTURE: CPT

## 2025-08-11 PROCEDURE — 96375 TX/PRO/DX INJ NEW DRUG ADDON: CPT

## 2025-08-11 PROCEDURE — 96374 THER/PROPH/DIAG INJ IV PUSH: CPT

## 2025-08-11 PROCEDURE — 96416 CHEMO PROLONG INFUSE W/PUMP: CPT

## 2025-08-11 PROCEDURE — 86301 IMMUNOASSAY TUMOR CA 19-9: CPT

## 2025-08-11 PROCEDURE — 85025 COMPLETE CBC W/AUTO DIFF WBC: CPT

## 2025-08-11 PROCEDURE — 96360 HYDRATION IV INFUSION INIT: CPT

## 2025-08-11 PROCEDURE — 96361 HYDRATE IV INFUSION ADD-ON: CPT

## 2025-08-11 PROCEDURE — 82378 CARCINOEMBRYONIC ANTIGEN: CPT

## 2025-08-11 PROCEDURE — 81232 DPYD GENE COMMON VARIANTS: CPT

## 2025-08-11 PROCEDURE — 96368 THER/DIAG CONCURRENT INF: CPT

## 2025-08-11 PROCEDURE — 80053 COMPREHEN METABOLIC PANEL: CPT

## 2025-08-11 PROCEDURE — 96417 CHEMO IV INFUS EACH ADDL SEQ: CPT

## 2025-08-11 PROCEDURE — 96365 THER/PROPH/DIAG IV INF INIT: CPT

## 2025-08-11 PROCEDURE — 96367 TX/PROPH/DG ADDL SEQ IV INF: CPT

## 2025-08-11 PROCEDURE — 96366 THER/PROPH/DIAG IV INF ADDON: CPT

## 2025-08-11 PROCEDURE — 96413 CHEMO IV INFUSION 1 HR: CPT

## 2025-08-11 PROCEDURE — 96415 CHEMO IV INFUSION ADDL HR: CPT

## 2025-08-11 PROCEDURE — 81404 MOPATH PROCEDURE LEVEL 5: CPT

## 2025-08-13 ENCOUNTER — NON-APPOINTMENT (OUTPATIENT)
Age: 75
End: 2025-08-13

## 2025-08-13 ENCOUNTER — APPOINTMENT (OUTPATIENT)
Dept: PHYSICAL MEDICINE AND REHAB | Facility: CLINIC | Age: 75
End: 2025-08-13

## 2025-08-14 ENCOUNTER — RESULT REVIEW (OUTPATIENT)
Age: 75
End: 2025-08-14

## 2025-08-18 ENCOUNTER — APPOINTMENT (OUTPATIENT)
Dept: INFUSION THERAPY | Facility: CANCER CENTER | Age: 75
End: 2025-08-18

## 2025-08-19 ENCOUNTER — APPOINTMENT (OUTPATIENT)
Dept: HEMATOLOGY ONCOLOGY | Facility: CLINIC | Age: 75
End: 2025-08-19

## 2025-08-19 LAB
FULL GENE SEQUENCE RESULT: SIGNIFICANT CHANGE UP
METHOD:: SIGNIFICANT CHANGE UP
MOL DX INTERP BLD/T QL: NEGATIVE — SIGNIFICANT CHANGE UP
REVIEWED BY: SIGNIFICANT CHANGE UP
TA REPEAT RESULT: SIGNIFICANT CHANGE UP
TEST PERFORMANCE INFO SPEC: SIGNIFICANT CHANGE UP
UGT1A1 GENE MUT ANL BLD/T: SIGNIFICANT CHANGE UP

## 2025-08-20 ENCOUNTER — APPOINTMENT (OUTPATIENT)
Dept: INFUSION THERAPY | Facility: CANCER CENTER | Age: 75
End: 2025-08-20

## 2025-08-21 LAB
DPYD GENE PROD MET ACT IMP BLD/T-IMP: NORMAL — SIGNIFICANT CHANGE UP
DPYD GENE PROD MET ACT IMP BLD/T-IMP: SIGNIFICANT CHANGE UP
DPYD PHENOTYPE: NORMAL — SIGNIFICANT CHANGE UP
Lab: SIGNIFICANT CHANGE UP
Lab: SIGNIFICANT CHANGE UP
PATHOLOGY STUDY: SIGNIFICANT CHANGE UP

## 2025-09-02 ENCOUNTER — APPOINTMENT (OUTPATIENT)
Dept: INFUSION THERAPY | Facility: CANCER CENTER | Age: 75
End: 2025-09-02

## 2025-09-03 ENCOUNTER — NON-APPOINTMENT (OUTPATIENT)
Age: 75
End: 2025-09-03

## 2025-09-04 ENCOUNTER — APPOINTMENT (OUTPATIENT)
Dept: INFUSION THERAPY | Facility: CANCER CENTER | Age: 75
End: 2025-09-04

## 2025-09-05 ENCOUNTER — APPOINTMENT (OUTPATIENT)
Dept: PHYSICAL MEDICINE AND REHAB | Facility: CLINIC | Age: 75
End: 2025-09-05

## 2025-09-05 ENCOUNTER — APPOINTMENT (OUTPATIENT)
Dept: RADIATION ONCOLOGY | Facility: CLINIC | Age: 75
End: 2025-09-05

## 2025-09-08 ENCOUNTER — NON-APPOINTMENT (OUTPATIENT)
Age: 75
End: 2025-09-08

## 2025-09-15 ENCOUNTER — APPOINTMENT (OUTPATIENT)
Facility: CLINIC | Age: 75
End: 2025-09-15

## 2025-09-16 ENCOUNTER — APPOINTMENT (OUTPATIENT)
Dept: INFUSION THERAPY | Facility: CANCER CENTER | Age: 75
End: 2025-09-16

## 2025-09-18 ENCOUNTER — APPOINTMENT (OUTPATIENT)
Dept: INFUSION THERAPY | Facility: CANCER CENTER | Age: 75
End: 2025-09-18

## (undated) DEVICE — DRSG CURITY GAUZE SPONGE 4 X 4" 12-PLY NON-STERILE

## (undated) DEVICE — TUBING IV EXTENSION MACRO W CLAVE 7"

## (undated) DEVICE — PACK IV START WITH CHG

## (undated) DEVICE — KIT DEFENDO 4 OLY 4 PC

## (undated) DEVICE — NDL BOSTON SCIENTIFIC RX TRIPLE LUMEN NDL KNIFE XL 5.5F

## (undated) DEVICE — SENSOR O2 FINGER ADULT

## (undated) DEVICE — SOL BAG NS 0.9% 1000ML

## (undated) DEVICE — SSH-EBUS LINEAR 1311057: Type: DURABLE MEDICAL EQUIPMENT

## (undated) DEVICE — DENTURE CUP PINK

## (undated) DEVICE — DVC SAMPLING INFINITY ERCP 7.5FR 200CM

## (undated) DEVICE — FORCEP RADIAL JAW 4 W NDL 2.4MM 2.8MM 240CM ORANGE DISP

## (undated) DEVICE — VENODYNE/SCD SLEEVE CALF MEDIUM

## (undated) DEVICE — SYR IV FLUSH SALINE 10ML 30/TY

## (undated) DEVICE — CATH IV SAFE BC 22G X 1" (BLUE)

## (undated) DEVICE — GOWN IMPERV XL

## (undated) DEVICE — SSH-ERBE RM1 11351341: Type: DURABLE MEDICAL EQUIPMENT

## (undated) DEVICE — UNDERPAD LINEN SAVER 23 X 36"

## (undated) DEVICE — SYR LUER SLIP TIP 50CC

## (undated) DEVICE — DRSG 2X2

## (undated) DEVICE — SOL IRR BAG H2O 1000ML

## (undated) DEVICE — TUBING ALARIS PUMP MODULE NON-DEHP

## (undated) DEVICE — Device

## (undated) DEVICE — SOL IRR BAG NS 0.9% 1000ML

## (undated) DEVICE — MASK PROCEDURE EARLOOP LVL 2 50/BX

## (undated) DEVICE — SYR SLIP 10CC

## (undated) DEVICE — WARMING BLANKET FULL ADULT